# Patient Record
Sex: FEMALE | Race: BLACK OR AFRICAN AMERICAN | NOT HISPANIC OR LATINO | Employment: OTHER | URBAN - METROPOLITAN AREA
[De-identification: names, ages, dates, MRNs, and addresses within clinical notes are randomized per-mention and may not be internally consistent; named-entity substitution may affect disease eponyms.]

---

## 2023-04-13 DIAGNOSIS — E11.9 TYPE 2 DIABETES MELLITUS WITHOUT COMPLICATION, WITHOUT LONG-TERM CURRENT USE OF INSULIN (MULTI): ICD-10-CM

## 2023-04-13 DIAGNOSIS — E11.9 TYPE 2 DIABETES MELLITUS WITHOUT COMPLICATIONS (MULTI): ICD-10-CM

## 2023-04-13 DIAGNOSIS — J30.9 ALLERGIC RHINITIS, UNSPECIFIED SEASONALITY, UNSPECIFIED TRIGGER: Primary | ICD-10-CM

## 2023-04-13 DIAGNOSIS — I10 PRIMARY HYPERTENSION: ICD-10-CM

## 2023-04-13 RX ORDER — SEMAGLUTIDE 1.34 MG/ML
0.5 INJECTION, SOLUTION SUBCUTANEOUS
COMMUNITY
Start: 2023-02-17 | End: 2023-04-17 | Stop reason: SDUPTHER

## 2023-04-13 RX ORDER — LISINOPRIL 20 MG/1
TABLET ORAL
Qty: 180 TABLET | Refills: 0 | Status: SHIPPED | OUTPATIENT
Start: 2023-04-13 | End: 2023-07-31

## 2023-04-13 RX ORDER — MONTELUKAST SODIUM 10 MG/1
TABLET ORAL
Qty: 90 TABLET | Refills: 0 | Status: SHIPPED | OUTPATIENT
Start: 2023-04-13 | End: 2023-12-11

## 2023-04-13 RX ORDER — CLOPIDOGREL BISULFATE 75 MG/1
TABLET ORAL
Qty: 90 TABLET | Refills: 0 | Status: SHIPPED | OUTPATIENT
Start: 2023-04-13 | End: 2023-07-31 | Stop reason: SDUPTHER

## 2023-04-17 RX ORDER — SEMAGLUTIDE 1.34 MG/ML
0.5 INJECTION, SOLUTION SUBCUTANEOUS
Qty: 3 ML | Refills: 0 | Status: SHIPPED | OUTPATIENT
Start: 2023-04-17 | End: 2023-04-24 | Stop reason: ALTCHOICE

## 2023-04-24 ENCOUNTER — TELEMEDICINE (OUTPATIENT)
Dept: PRIMARY CARE | Facility: CLINIC | Age: 78
End: 2023-04-24
Payer: MEDICARE

## 2023-04-24 DIAGNOSIS — E11.9 TYPE 2 DIABETES MELLITUS WITHOUT COMPLICATION, WITHOUT LONG-TERM CURRENT USE OF INSULIN (MULTI): Primary | ICD-10-CM

## 2023-04-24 PROCEDURE — 99213 OFFICE O/P EST LOW 20 MIN: CPT | Performed by: INTERNAL MEDICINE

## 2023-04-24 NOTE — PROGRESS NOTES
Katarina presents in virtual FU for Diabetes to 2.2023 MCR/CPE.   At that time started Ozempic s/p 4 wks 0.25 and 2 wks 0.5   No intolerance   Will uptitrate to 1 mg   Will order labs ahead of 2 mo in person FU (June 2023)       1. HTN   -coreg 12.5 BID   -lisin 20     2. HFpEF   -torsemide 20 hussein y  -h/o worsening edema on amlo   -TTE 6.22 reviewed     3. COPD/Asthma/ILD (UIP vs hypersensitivity pneumonitis)   -FU pulm   -continuous 3L O2   -Advair   -Incruse    4. T2DM 7.7% in 6.22  -Jardiance  -Humulin 42/47 BID   +atorva 40   +lisin   -FU opthal and podiatry 1.2023     6. GERD, ulcerative colitis   -pantoprazole   -balsalazide   -cscope 11.22: UC Todd Score 1 (mild disease), unchanged from previous     7. DLD, CAC of 133 in 1.22   -atorva 40   -at goal 6.22     8. AMY, on PAP   -FU sleep      #HM   -cscope 11.22 for UC  -shingrix x2   -tdap due   -prevnar due   -pneumovax utd  -mammo 8.31.22 , has been every other year

## 2023-04-28 DIAGNOSIS — I50.9 HEART FAILURE, UNSPECIFIED HF CHRONICITY, UNSPECIFIED HEART FAILURE TYPE (MULTI): Primary | ICD-10-CM

## 2023-05-01 RX ORDER — TORSEMIDE 20 MG/1
TABLET ORAL
Qty: 90 TABLET | Refills: 1 | Status: SHIPPED | OUTPATIENT
Start: 2023-05-01 | End: 2023-10-30

## 2023-05-01 RX ORDER — CARVEDILOL 12.5 MG/1
TABLET ORAL
Qty: 180 TABLET | Refills: 1 | Status: SHIPPED | OUTPATIENT
Start: 2023-05-01 | End: 2023-10-30

## 2023-05-03 DIAGNOSIS — J45.909 ASTHMA, UNSPECIFIED ASTHMA SEVERITY, UNSPECIFIED WHETHER COMPLICATED, UNSPECIFIED WHETHER PERSISTENT (HHS-HCC): ICD-10-CM

## 2023-05-03 RX ORDER — ALBUTEROL SULFATE 90 UG/1
AEROSOL, METERED RESPIRATORY (INHALATION)
COMMUNITY
Start: 2020-01-21 | End: 2023-05-03 | Stop reason: SDUPTHER

## 2023-05-04 RX ORDER — ALBUTEROL SULFATE 90 UG/1
AEROSOL, METERED RESPIRATORY (INHALATION)
Qty: 25.5 G | Refills: 1 | Status: SHIPPED | OUTPATIENT
Start: 2023-05-04 | End: 2023-07-20

## 2023-05-31 ENCOUNTER — LAB (OUTPATIENT)
Dept: LAB | Facility: LAB | Age: 78
End: 2023-05-31
Payer: MEDICARE

## 2023-05-31 DIAGNOSIS — E11.9 TYPE 2 DIABETES MELLITUS WITHOUT COMPLICATION, WITHOUT LONG-TERM CURRENT USE OF INSULIN (MULTI): ICD-10-CM

## 2023-05-31 LAB
ALANINE AMINOTRANSFERASE (SGPT) (U/L) IN SER/PLAS: 14 U/L (ref 7–45)
ALBUMIN (G/DL) IN SER/PLAS: 3.9 G/DL (ref 3.4–5)
ALKALINE PHOSPHATASE (U/L) IN SER/PLAS: 79 U/L (ref 33–136)
ANION GAP IN SER/PLAS: 11 MMOL/L (ref 10–20)
ASPARTATE AMINOTRANSFERASE (SGOT) (U/L) IN SER/PLAS: 16 U/L (ref 9–39)
BILIRUBIN TOTAL (MG/DL) IN SER/PLAS: 0.4 MG/DL (ref 0–1.2)
CALCIUM (MG/DL) IN SER/PLAS: 10.1 MG/DL (ref 8.6–10.6)
CARBON DIOXIDE, TOTAL (MMOL/L) IN SER/PLAS: 34 MMOL/L (ref 21–32)
CHLORIDE (MMOL/L) IN SER/PLAS: 101 MMOL/L (ref 98–107)
CREATININE (MG/DL) IN SER/PLAS: 0.89 MG/DL (ref 0.5–1.05)
ESTIMATED AVERAGE GLUCOSE FOR HBA1C: 151 MG/DL
GFR FEMALE: 66 ML/MIN/1.73M2
GLUCOSE (MG/DL) IN SER/PLAS: 124 MG/DL (ref 74–99)
HEMOGLOBIN A1C/HEMOGLOBIN TOTAL IN BLOOD: 6.9 %
POTASSIUM (MMOL/L) IN SER/PLAS: 4.4 MMOL/L (ref 3.5–5.3)
PROTEIN TOTAL: 7.2 G/DL (ref 6.4–8.2)
SODIUM (MMOL/L) IN SER/PLAS: 142 MMOL/L (ref 136–145)
UREA NITROGEN (MG/DL) IN SER/PLAS: 28 MG/DL (ref 6–23)

## 2023-05-31 PROCEDURE — 83036 HEMOGLOBIN GLYCOSYLATED A1C: CPT

## 2023-05-31 PROCEDURE — 36415 COLL VENOUS BLD VENIPUNCTURE: CPT

## 2023-05-31 PROCEDURE — 80053 COMPREHEN METABOLIC PANEL: CPT

## 2023-06-06 ENCOUNTER — OFFICE VISIT (OUTPATIENT)
Dept: PRIMARY CARE | Facility: CLINIC | Age: 78
End: 2023-06-06
Payer: MEDICARE

## 2023-06-06 VITALS
WEIGHT: 213 LBS | HEIGHT: 61 IN | DIASTOLIC BLOOD PRESSURE: 60 MMHG | BODY MASS INDEX: 40.22 KG/M2 | SYSTOLIC BLOOD PRESSURE: 105 MMHG

## 2023-06-06 DIAGNOSIS — H60.90 OTITIS EXTERNA, UNSPECIFIED CHRONICITY, UNSPECIFIED LATERALITY, UNSPECIFIED TYPE: ICD-10-CM

## 2023-06-06 DIAGNOSIS — E11.9 TYPE 2 DIABETES MELLITUS WITHOUT COMPLICATION, WITHOUT LONG-TERM CURRENT USE OF INSULIN (MULTI): Primary | ICD-10-CM

## 2023-06-06 PROCEDURE — 99214 OFFICE O/P EST MOD 30 MIN: CPT | Performed by: INTERNAL MEDICINE

## 2023-06-06 RX ORDER — UMECLIDINIUM 62.5 UG/1
1 AEROSOL, POWDER ORAL DAILY
COMMUNITY
Start: 2020-12-21

## 2023-06-06 RX ORDER — CIPROFLOXACIN AND DEXAMETHASONE 3; 1 MG/ML; MG/ML
4 SUSPENSION/ DROPS AURICULAR (OTIC) 2 TIMES DAILY
Qty: 7.5 ML | Refills: 0 | Status: SHIPPED | OUTPATIENT
Start: 2023-06-06 | End: 2023-06-16

## 2023-06-06 RX ORDER — INSULIN HUMAN 100 [IU]/ML
47 INJECTION, SUSPENSION SUBCUTANEOUS NIGHTLY
COMMUNITY
Start: 2019-02-22

## 2023-06-06 RX ORDER — PANTOPRAZOLE SODIUM 40 MG/1
1 TABLET, DELAYED RELEASE ORAL DAILY
COMMUNITY
Start: 2015-09-02 | End: 2023-08-24 | Stop reason: SDUPTHER

## 2023-06-06 RX ORDER — BALSALAZIDE DISODIUM 750 MG/1
1500 CAPSULE ORAL 3 TIMES DAILY
COMMUNITY
Start: 2016-01-20

## 2023-06-06 RX ORDER — FLUTICASONE PROPIONATE AND SALMETEROL 500; 50 UG/1; UG/1
1 POWDER RESPIRATORY (INHALATION) 2 TIMES DAILY
COMMUNITY
Start: 2019-02-27

## 2023-06-06 RX ORDER — EMPAGLIFLOZIN 10 MG/1
1 TABLET, FILM COATED ORAL DAILY
COMMUNITY
Start: 2022-08-09 | End: 2023-06-26 | Stop reason: SDUPTHER

## 2023-06-06 RX ORDER — HUMAN INSULIN 100 [IU]/ML
42 INJECTION, SUSPENSION SUBCUTANEOUS EVERY MORNING
COMMUNITY
Start: 2023-01-08 | End: 2024-02-05

## 2023-06-06 NOTE — PATIENT INSTRUCTIONS
Katarina,     Reduce your insulin from 42/47 units to 35 units twice a day. If you continue to have ANY sugars under 70, reduce further to 30/30 and call me !   I think Ozempic has been great for your sugar control and will eventually yield significant weight loss, but I am afraid it is causing your GI discomfort. I think we should try going down to 0.5 mg instead of 1 mg the next time you fill it and see how that goes. If you still feel the same, we might even need to consider stopping the Ozmepic - which may mean going up on the insulin a little bit.   [place] [facility] - you can message me anytime and see lab results!   Change your lisinopril from 20 mg twice a day to 20 mg once a day - at bedtime only.   See me back in 3 mo!     CL

## 2023-06-06 NOTE — PROGRESS NOTES
Subjective   Patient ID: Katarina Hernandez is a 77 y.o. female who presents for No chief complaint on file..  HPI    Katarina Hernandez is a 76 yo F presenting for f/u. Last seen on 2/2023 for CPE. Then had a virtual appointment 4/2023. Was started on ozempic 0.25 for 4 weeks, 0.5 for 2 weeks, now on 1mg. No diarrhea, but does note some stomach discomfort throughout the day. Started when she first started on ozempic. It's tolerable but uncomfortable.     Sometimes sees glucose as low as 60s. Have had some shakiness and sweating once a week or so. Checks sugars 2 times a day.     BPs at home 110s systolics. No leg swelling.     Some pain in the right ear for the last few days. Has some vertigo on and off.     Also some stinging/burning pain in the thighs.     Review of Systems    Objective   Visit Vitals  /60      Physical Exam  Gen Aox3 NAD well appearing   HEENT: R ear slight erythema in the canal  Eyes sclerae clear PERRL  CV rrr nl s1/2 no m/r/g   Pulm CTAB, slight crackles in bases  Abd soft NT ND NABS   Ext warm dry no edema   Skin no rash   Psych nl mood nl affect good eye contact    Assessment/Plan     #DMII  -recently started on ozempic, some stomach discomfort  -reduce ozempic to 0.5mg  -A1C 6.9 (from 7.9 3 months prior), but with hypoglycemic episodes  -novolin 42/47 BID -> reduce to 35/35  -c/w jardiance 10  -follows with ophthal and podiatry  -urine alb <7 in 2/2023    #Neuralgia paresthetica  -will monitor for now    #R ear discomfort  -possible mild otitis externa    #HTN  -BP 100s/60s in the office, will continue to watch  -c/w coreg 12.5, lisin 20  -hx of leg swelling on amlo    #HFpEF  -c/w torsemide 20  -TTE 6/2022: preserved EF    #COPD/Asthma/ILD (UIP vs hypersensitivity pneumonitis)  -f/u pulm, on 2L NC at rest  -on Advair 500/50 twice daily and Incruse Ellipta daily, montelukast  -CT chest 5/2023 with stable fibrosis, mildly dilated main PA    #DLD/TIA  -c/w atorva 40, plavix  -LDL at goal (59)  on 2/2023    #UC, GERD  -c/w protonix, balsalazide  -Cscope 11/22 w/ mild disease (wyman score 1), unchanged    #AMY, on PAP   -FU sleep    #HM   -cscope 11.22 for UC, f/u with GI for surveillance timing  -shingrix x2   -tdap, prevnar 2023   -pneumovax utd  -mammo 8.31.22 , has been every other year

## 2023-06-10 NOTE — PROGRESS NOTES
I reviewed with the resident the medical history and the resident’s findings on physical examination.  I discussed with the resident the patient’s diagnosis and concur with the treatment plan as documented in the resident note.         Katarina Hernandez presents in FU to 4.23 visit.   CPE/MCR 2.2023.       At that time started Ozempic s/p 4 wks 0.25 and 2 wks 0.5   No intolerance   Will uptitrate to 1 mg   Will order labs ahead of 2 mo in person FU (June 2023)         1. HTN   -coreg 12.5 BID   -lisin 20      2. HFpEF   -torsemide 20 hussein y  -h/o worsening edema on amlo   -TTE 6.22 reviewed      3. COPD/Asthma/ILD (UIP vs hypersensitivity pneumonitis)   -FU pulm   -continuous 3L O2   -Advair   -Incruse     4. T2DM 7.7% in 6.22  -Jardiance  -Humulin 42/47 BID   +atorva 40   +lisin   -FU opthal and podiatry 1.2023      6. GERD, ulcerative colitis   -pantoprazole   -balsalazide   -cscope 11.22: UC Todd Score 1 (mild disease), unchanged from previous      7. DLD, CAC of 133 in 1.22   -atorva 40   -at goal 6.22      8. AMY, on PAP   -FU sleep        #HM   -cscope 11.22 for UC  -shingrix x2   -tdap due   -prevnar due   -pneumovax utd  -mammo 8.31.22 , has been every other year           Ana Clark MD

## 2023-06-26 DIAGNOSIS — E11.9 TYPE 2 DIABETES MELLITUS WITHOUT COMPLICATION, WITHOUT LONG-TERM CURRENT USE OF INSULIN (MULTI): ICD-10-CM

## 2023-07-18 DIAGNOSIS — J45.909 ASTHMA, UNSPECIFIED ASTHMA SEVERITY, UNSPECIFIED WHETHER COMPLICATED, UNSPECIFIED WHETHER PERSISTENT (HHS-HCC): ICD-10-CM

## 2023-07-20 RX ORDER — ALBUTEROL SULFATE 90 UG/1
AEROSOL, METERED RESPIRATORY (INHALATION)
Qty: 18 G | Refills: 1 | Status: SHIPPED | OUTPATIENT
Start: 2023-07-20 | End: 2024-06-10

## 2023-07-31 ENCOUNTER — OFFICE VISIT (OUTPATIENT)
Dept: PRIMARY CARE | Facility: CLINIC | Age: 78
End: 2023-07-31
Payer: MEDICARE

## 2023-07-31 VITALS
BODY MASS INDEX: 40.28 KG/M2 | OXYGEN SATURATION: 93 % | SYSTOLIC BLOOD PRESSURE: 102 MMHG | TEMPERATURE: 97.6 F | DIASTOLIC BLOOD PRESSURE: 62 MMHG | HEART RATE: 73 BPM | WEIGHT: 213.2 LBS

## 2023-07-31 DIAGNOSIS — E11.9 TYPE 2 DIABETES MELLITUS WITHOUT COMPLICATIONS (MULTI): ICD-10-CM

## 2023-07-31 DIAGNOSIS — H81.10 BENIGN PAROXYSMAL POSITIONAL VERTIGO, UNSPECIFIED LATERALITY: ICD-10-CM

## 2023-07-31 PROCEDURE — 1126F AMNT PAIN NOTED NONE PRSNT: CPT | Performed by: INTERNAL MEDICINE

## 2023-07-31 PROCEDURE — 99214 OFFICE O/P EST MOD 30 MIN: CPT | Performed by: INTERNAL MEDICINE

## 2023-07-31 RX ORDER — CLOPIDOGREL BISULFATE 75 MG/1
75 TABLET ORAL DAILY
Qty: 90 TABLET | Refills: 3 | Status: SHIPPED | OUTPATIENT
Start: 2023-07-31

## 2023-07-31 RX ORDER — LISINOPRIL 5 MG/1
5 TABLET ORAL DAILY
Qty: 90 TABLET | Refills: 3 | Status: SHIPPED | OUTPATIENT
Start: 2023-07-31 | End: 2024-05-13

## 2023-07-31 NOTE — LETTER
To Whom It May Concern:     Katarina Hernandez is under my general medical care and has type 2 diabetes, poor circulation and history of pre-ulcerative calluses. She will benefit from diabetic shoes and custom inserts because it will help reduce her risk of foot wounds, reduce her risk for future critical limb disease and help prevent falls.       Sincerely,         Ana Clark M.D.

## 2023-07-31 NOTE — PATIENT INSTRUCTIONS
Katarina,     Call the referral line to schedule vestibular rehabilitation.   Check your home lisinopril - if it is 20 mg, we should reduce it to 5 mg and I am sending a prescription for that. If it is already 5 mg, call or message and we can talk about reducing another one of your blood pressure meds (carvedilol).     CL

## 2023-07-31 NOTE — PROGRESS NOTES
Subjective   Patient ID: Katarina Hernandez is a 77 y.o. female She/her/hers who presents for No chief complaint on file..  HPI    MsChau Hernandez is presenting today in FU with ongoing vertigo and consistent nausea while on Ozempic.    She reports that she has been having vertigo-like symptoms and feeling like equilibrium is off. This has been intermittent long-term, but over the past two weeks has been daily. She also has a headache and feels more comfortable with neck extended. Her vertigo worsens at night when she turns in bed and while walking around. No falls or LOC. Got abx in June for external ear infection, feels her sx have been worse since taking those drops. Average blood sugar in 130s, no frequent lows since cutting insulin in June. BPs have been 110s since Lisinopril was cut from 10->5mg daily, but once spiked to 140s last week.    Additionally, she has been Ozempic and feels constantly nauseated and unwell, with epigastric discomfort. She feels as though her intestines are swollen and she feels bloated. Her dose was reduced to 0.5mg which has not changed her symptoms significantly. She has not had signficant weight loss since she was last seen, and she would like to stop taking the Ozempic. Has not been on any other GLP-1s in the past.    She denies any other complaints at this time.     PMH:   Past Medical History:   Diagnosis Date    Combined forms of age-related cataract, left eye 07/22/2019    Combined form of age-related cataract, left eye    Combined forms of age-related cataract, right eye 08/30/2019    Combined form of age-related cataract, right eye    Lesion of ulnar nerve, unspecified upper limb     Ulnar neuropathy    Personal history of colonic polyps     History of colonic polyps    Transient cerebral ischemic attack, unspecified     TIA (transient ischemic attack)    Unspecified acute conjunctivitis, bilateral 01/27/2020    Acute conjunctivitis of both eyes    Unspecified cataract      Cataracts, both eyes    Viral conjunctivitis, unspecified 11/08/2019    Acute viral conjunctivitis of right eye      PSH:   Past Surgical History:   Procedure Laterality Date    MR HEAD ANGIO WO IV CONTRAST  1/19/2019    MR HEAD ANGIO WO IV CONTRAST 1/19/2019 [facility]    MR NECK ANGIO WO IV CONTRAST  1/19/2019    MR NECK ANGIO WO IV CONTRAST 1/19/2019 [facility]    OTHER SURGICAL HISTORY  01/28/2019    Appendectomy    OTHER SURGICAL HISTORY  01/14/2020    Colonoscopy    OTHER SURGICAL HISTORY  01/14/2020    Esophagogastroduodenoscopy    OTHER SURGICAL HISTORY  09/09/2019    Cataract surgery      Allergies: No Known Allergies   Medications:   Current Outpatient Medications:     albuterol 90 mcg/actuation inhaler, USE 1 TO 2 INHALATIONS EVERY 4 TO 6 HOURS AS NEEDED, Disp: 18 g, Rfl: 1    atorvastatin (Lipitor) 40 mg tablet, TAKE 1 TABLET AT BEDTIME, Disp: 90 tablet, Rfl: 0    balsalazide (Colazal) 750 mg capsule, Take 2 capsules (1,500 mg) by mouth 3 times a day., Disp: , Rfl:     carvedilol (Coreg) 12.5 mg tablet, TAKE 1 TABLET BY MOUTH TWICE A DAY, Disp: 180 tablet, Rfl: 1    clopidogrel (Plavix) 75 mg tablet, TAKE 1 TABLET BY MOUTH EVERY DAY, Disp: 90 tablet, Rfl: 0    empagliflozin (Jardiance) 10 mg, Take 1 tablet (10 mg) by mouth once daily., Disp: 90 tablet, Rfl: 1    fluticasone propion-salmeteroL (Advair Diskus) 500-50 mcg/dose diskus inhaler, Inhale 1 puff 2 times a day., Disp: , Rfl:     insulin NPH, Isophane, (HumuLIN N NPH U-100 Insulin) 100 unit/mL injection, Inject 47 Units under the skin once daily at bedtime., Disp: , Rfl:     insulin NPH, Isophane, (NovoLIN N NPH U-100 Insulin) 100 unit/mL injection, Inject 42 Units under the skin once daily in the morning., Disp: , Rfl:     lisinopril 20 mg tablet, TAKE 1 TABLET BY MOUTH TWICE A DAY, Disp: 180 tablet, Rfl: 0    montelukast (Singulair) 10 mg tablet, TAKE 1 TABLET BY MOUTH EVERY DAY, Disp: 90 tablet, Rfl: 0    pantoprazole  (ProtoNix) 40 mg EC tablet, Take 1 tablet (40 mg) by mouth once daily., Disp: , Rfl:     semaglutide 0.25 mg or 0.5 mg (2 mg/3 mL) pen injector, Inject 0.5 mg under the skin 1 (one) time per week., Disp: 9 mL, Rfl: 1    torsemide (Demadex) 20 mg tablet, TAKE 1 TABLET BY MOUTH EVERY DAY, Disp: 90 tablet, Rfl: 1    umeclidinium (Incruse Ellipta) 62.5 mcg/actuation inhalation, Inhale 1 puff (62.5 mcg) once daily., Disp: , Rfl:    FH: family history is not on file.   Social Hx:   Social Determinants of Health     Tobacco Use: Not on file   Alcohol Use: Not on file   Financial Resource Strain: Not on file   Food Insecurity: Not on file   Transportation Needs: Not on file   Physical Activity: Not on file   Stress: Not on file   Social Connections: Not on file   Intimate Partner Violence: Not on file   Depression: Not on file   Housing Stability: Not on file   Utilities: Not on file        Review of Systems    Objective   There were no vitals taken for this visit.   Physical Exam  Constitutional: awake and alert, resting comfortably in NAD, alert and oriented x3  Eyes: No scleral icterus or conjunctival injection, PERRLA, EOM grossly intact  ENMT: MMM, NC in place  Head/Neck: NC/AT, no cervical LAD  Respiratory/Thorax: Breathing comfortably with NC. No retractions or accessory muscle use. Diminished air entry into all lung fields. CTAB, no wheezes, rales, rhonchi or crackles  Cardiovascular: RRR, +S1, S2, no m/r/g  Gastrointestinal: normoactive BS, soft, ND, dull tenderness to palpation of LUQ and epigastric region. No rebound or guarding.  Extremities: warm and well perfused, no LE edema, 2+ radial and dorsalis pedis pulses b/l, capillary refill <2s  Neurological: motor and sensation grossly intact and symmetric  Psychological: Mood and affect appropriate for age         Assessment/Plan   Problem List Items Addressed This Visit    None    Ms. Katarina Hernandez is a 77-year-old female with history of COPD on 2 L nasal cannula  at baseline, hypertension, type 2 diabetes, obesity presenting in follow-up for ongoing symptoms of vertigo and persistent nausea while on Ozempic.  Her vertiginous symptoms appear most likely secondary to BPPV rather intra auricular process.  She was recently treated for middle ear infection, however on exam today findings are not concerning for ongoing infection, and this would not explain her symptoms adequately.  Additionally, she does appear to have some degree of possible relative hypotension, however her symptoms are exacerbated with turning in bed at night.  We will continue to monitor and titrate blood pressure medications as needed.  Blood sugars have remained well controlled while on Ozempic, and with reduction in insulin dosing.  At this time, the patient is not tolerating Ozempic given significant GI side effects and would like to discontinue.  We will trial off for 1 month to for symptom improvement, and will reassess how to optimize diabetes management at that time.    #DMII  -not tolerating Ozempic w/sig GI side effects after downtitration of dose, will discontinue at this time and fu in 1 month  -A1C 6.9 (from 7.9 3 months prior)  -novolin 42/47 BID -> reduced to 35/35, fewer hypoglycemic episodes  -c/w jardiance 10  -follows with ophthal and podiatry  -urine alb <7 in 2/2023  -will monitor for sx improvement off ozempic and determine optimal pharmacotherapy for DM management in 1 month     #Neuralgia paresthetica  -will monitor for now     #Vertigo  #R ear discomfort  -s/p abx therapy for mild otitis externa without sx improvement  -will refer to vestibular therapy     #HTN  -BP 100s/60s in the office, will continue to watch  -c/w coreg 12.5, lisin 5  -hx of leg swelling on amlo     #HFpEF  -c/w torsemide 20  -TTE 6/2022: preserved EF     #COPD/Asthma/ILD (UIP vs hypersensitivity pneumonitis)  -f/u pulm, on 2L NC at rest  -on Advair 500/50 twice daily and Incruse Ellipta daily, montelukast  -CT  chest 5/2023 with stable fibrosis, mildly dilated main PA     #DLD/TIA  -c/w atorva 40, plavix  -LDL at goal (59) on 2/2023     #UC, GERD  -c/w protonix, balsalazide  -Cscope 11/22 w/ mild disease (wyman score 1), unchanged     #AMY, on PAP   -FU sleep     #HM   -cscope 11.22 for UC, f/u with GI for surveillance timing  -shingrix x2   -tdap, prevnar 2023   -pneumovax utd  -mammo 8.31.22 , has been every other year     The patient was seen and discussed with Attending Physician, Dr. Muñoz.    Beth Tariq MD, MPH  Internal Medicine-Pediatrics, PGY-2  [facility]  [facility]  Please note that voice recognition software was used to dictate this note. Please excuse any errors.

## 2023-08-23 ENCOUNTER — TELEPHONE (OUTPATIENT)
Dept: PRIMARY CARE | Facility: CLINIC | Age: 78
End: 2023-08-23
Payer: MEDICARE

## 2023-08-23 NOTE — TELEPHONE ENCOUNTER
Patient  said can she just stop pantoprazole or can you prescribe something else  its not working for her    Also there was forms sent for diabetic shoes, she need you to fill , she said they were sent at beginning of the month

## 2023-08-24 DIAGNOSIS — K21.9 GASTROESOPHAGEAL REFLUX DISEASE, UNSPECIFIED WHETHER ESOPHAGITIS PRESENT: ICD-10-CM

## 2023-08-24 DIAGNOSIS — K21.9 GASTROESOPHAGEAL REFLUX DISEASE, UNSPECIFIED WHETHER ESOPHAGITIS PRESENT: Primary | ICD-10-CM

## 2023-08-24 RX ORDER — PANTOPRAZOLE SODIUM 40 MG/1
40 TABLET, DELAYED RELEASE ORAL DAILY
Qty: 90 TABLET | Refills: 3 | Status: SHIPPED | OUTPATIENT
Start: 2023-08-24 | End: 2023-09-01

## 2023-09-01 DIAGNOSIS — K21.9 GASTROESOPHAGEAL REFLUX DISEASE, UNSPECIFIED WHETHER ESOPHAGITIS PRESENT: ICD-10-CM

## 2023-09-01 RX ORDER — LANSOPRAZOLE 30 MG/1
CAPSULE, DELAYED RELEASE ORAL
Qty: 90 CAPSULE | Refills: 3 | Status: SHIPPED | OUTPATIENT
Start: 2023-09-01 | End: 2023-09-06

## 2023-09-05 DIAGNOSIS — E78.5 HYPERLIPIDEMIA, UNSPECIFIED HYPERLIPIDEMIA TYPE: ICD-10-CM

## 2023-09-05 RX ORDER — ATORVASTATIN CALCIUM 40 MG/1
40 TABLET, FILM COATED ORAL NIGHTLY
Qty: 90 TABLET | Refills: 1 | Status: SHIPPED | OUTPATIENT
Start: 2023-09-05 | End: 2024-02-05

## 2023-09-06 RX ORDER — PANTOPRAZOLE SODIUM 40 MG/1
TABLET, DELAYED RELEASE ORAL
Qty: 90 TABLET | Refills: 3 | Status: SHIPPED | OUTPATIENT
Start: 2023-09-06

## 2023-10-03 ENCOUNTER — PROCEDURE VISIT (OUTPATIENT)
Dept: PODIATRY | Facility: CLINIC | Age: 78
End: 2023-10-03
Payer: MEDICARE

## 2023-10-03 DIAGNOSIS — B35.1 ONYCHOMYCOSIS: ICD-10-CM

## 2023-10-03 DIAGNOSIS — G62.9 NEUROPATHY: ICD-10-CM

## 2023-10-03 DIAGNOSIS — M79.672 FOOT PAIN, BILATERAL: Primary | ICD-10-CM

## 2023-10-03 DIAGNOSIS — M79.671 FOOT PAIN, BILATERAL: Primary | ICD-10-CM

## 2023-10-03 PROCEDURE — 99213 OFFICE O/P EST LOW 20 MIN: CPT | Performed by: PODIATRIST

## 2023-10-03 NOTE — PROGRESS NOTES
This is a 78 y.o. female new patient for foot pain fu    History of Present Illness:   Patient states they are here for nails as patient states she is unable to safely trim on her own  Patient also states the cream she has cleared up her prior rash  States she has NTB to feet, it does not wake her at night.       Past Medical History  Past Medical History:   Diagnosis Date    Combined forms of age-related cataract, left eye 07/22/2019    Combined form of age-related cataract, left eye    Combined forms of age-related cataract, right eye 08/30/2019    Combined form of age-related cataract, right eye    Lesion of ulnar nerve, unspecified upper limb     Ulnar neuropathy    Personal history of colonic polyps     History of colonic polyps    Transient cerebral ischemic attack, unspecified     TIA (transient ischemic attack)    Unspecified acute conjunctivitis, bilateral 01/27/2020    Acute conjunctivitis of both eyes    Unspecified cataract     Cataracts, both eyes    Viral conjunctivitis, unspecified 11/08/2019    Acute viral conjunctivitis of right eye       Medications and Allergies have been reviewed.    Review Of Systems:  GENERAL: No weight loss, malaise or fevers.  HEENT: Negative for frequent or significant headaches,   RESPIRATORY: Negative for cough, wheezing or shortness of breath.  CARDIOVASCULAR: Negative for chest pain, leg swelling or palpitations.    Physical Exam:  Patient is a pleasant, cooperative, well developed 78 y.o.  adult female. The patient is alert and oriented to time, place and person.   Patient has normal affect and mood.    Examination of Both Lower Extremities:   Objective:   Vasc: DP and PT pulses are palpable bilateral.  CFT is less than 3 seconds bilateral.  Skin temperature is warm to cool proximal to distal bilateral.      Neuro: Vibratory, light touch and proprioception are intact bilateral.  Protective sensation is intact to the foot . Admits to mild NTB    Derm: Nails 1-5 bilateral  are elongated.  Skin is supple with normal texture and turgor noted.  Webspaces are clean, dry and intact bilateral.      Ortho: Muscle strength is 5/5 for all pedal groups tested.  Ankle joint, subtalar joint, 1st MPJ and lesser MPJ ROM is full and without pain or crepitus.      A comprehensive history and physical exam was performed. The patient was educated on clinical and radiographic findings, diagnosis, and treatment plans.    1. Foot pain, bilateral        2. Neuropathy        3. Onychomycosis              Patient educated on proper foot care.  Nails 1-5 b/l were debrided in thickness and length with nail cutting forceps.  Use cream for rash prn. Area looks good as of now  Discussed neuropathy , decided no treatment needed at this time   Patient to follow up in 6 mos or sooner if any problems arise.   Patient was in agreement to this plan. All questions answered.      Melida Vega DPM  [phone]  [facility]  Fax: [phone]

## 2023-10-09 DIAGNOSIS — J44.9 CHRONIC OBSTRUCTIVE PULMONARY DISEASE, UNSPECIFIED COPD TYPE (MULTI): Primary | ICD-10-CM

## 2023-10-09 RX ORDER — FLUTICASONE FUROATE, UMECLIDINIUM BROMIDE AND VILANTEROL TRIFENATATE 200; 62.5; 25 UG/1; UG/1; UG/1
1 POWDER RESPIRATORY (INHALATION)
Qty: 1 EACH | Refills: 3 | Status: SHIPPED | OUTPATIENT
Start: 2023-10-09 | End: 2024-02-08 | Stop reason: SDUPTHER

## 2023-10-25 PROBLEM — R55 NEAR SYNCOPE: Status: ACTIVE | Noted: 2023-10-25

## 2023-10-25 PROBLEM — E11.65 UNCONTROLLED TYPE 2 DIABETES MELLITUS WITH HYPERGLYCEMIA (MULTI): Status: ACTIVE | Noted: 2023-10-25

## 2023-10-25 PROBLEM — H01.009 BLEPHARITIS OF BOTH UPPER AND LOWER EYELID: Status: ACTIVE | Noted: 2023-10-25

## 2023-10-25 PROBLEM — Z86.73 HISTORY OF TIA (TRANSIENT ISCHEMIC ATTACK): Status: ACTIVE | Noted: 2023-10-25

## 2023-10-25 PROBLEM — H43.811 PVD (POSTERIOR VITREOUS DETACHMENT), RIGHT EYE: Status: ACTIVE | Noted: 2023-10-25

## 2023-10-25 PROBLEM — J84.89 NSIP (NONSPECIFIC INTERSTITIAL PNEUMONIA) (MULTI): Status: ACTIVE | Noted: 2023-10-25

## 2023-10-25 PROBLEM — H40.1131 PRIMARY OPEN ANGLE GLAUCOMA (POAG) OF BOTH EYES, MILD STAGE: Status: ACTIVE | Noted: 2023-10-25

## 2023-10-25 PROBLEM — E11.9 DIABETES MELLITUS (MULTI): Status: ACTIVE | Noted: 2023-10-25

## 2023-10-25 PROBLEM — B35.1 ONYCHOMYCOSIS: Status: ACTIVE | Noted: 2023-10-25

## 2023-10-25 PROBLEM — M79.672 BILATERAL LEG AND FOOT PAIN: Status: ACTIVE | Noted: 2023-10-25

## 2023-10-25 PROBLEM — R00.2 PALPITATIONS: Status: ACTIVE | Noted: 2023-10-25

## 2023-10-25 PROBLEM — Z86.0100 HISTORY OF COLON POLYPS: Status: ACTIVE | Noted: 2023-10-25

## 2023-10-25 PROBLEM — H01.003 BLEPHARITIS, BILATERAL: Status: ACTIVE | Noted: 2023-10-25

## 2023-10-25 PROBLEM — Z86.59 HISTORY OF CLAUSTROPHOBIA: Status: ACTIVE | Noted: 2023-10-25

## 2023-10-25 PROBLEM — R49.9 CHANGE IN VOICE: Status: ACTIVE | Noted: 2023-10-25

## 2023-10-25 PROBLEM — B35.3 TINEA PEDIS OF BOTH FEET: Status: ACTIVE | Noted: 2023-10-25

## 2023-10-25 PROBLEM — J44.89 ASTHMA-COPD OVERLAP SYNDROME (MULTI): Status: ACTIVE | Noted: 2023-10-25

## 2023-10-25 PROBLEM — S00.461A: Status: ACTIVE | Noted: 2021-08-30

## 2023-10-25 PROBLEM — M25.512 LEFT SHOULDER PAIN: Status: ACTIVE | Noted: 2023-10-25

## 2023-10-25 PROBLEM — H26.492 PCO (POSTERIOR CAPSULAR OPACIFICATION), LEFT: Status: ACTIVE | Noted: 2023-10-25

## 2023-10-25 PROBLEM — K21.9 LPRD (LARYNGOPHARYNGEAL REFLUX DISEASE): Status: ACTIVE | Noted: 2023-10-25

## 2023-10-25 PROBLEM — H52.10 MYOPIA WITH PRESBYOPIA: Status: ACTIVE | Noted: 2023-10-25

## 2023-10-25 PROBLEM — H16.143 PUNCTATE EPITHELIAL KERATOPATHY OF BOTH EYES: Status: ACTIVE | Noted: 2018-02-06

## 2023-10-25 PROBLEM — M79.675 PAIN IN TOES OF BOTH FEET: Status: ACTIVE | Noted: 2023-10-25

## 2023-10-25 PROBLEM — L85.3 XEROSIS CUTIS: Status: ACTIVE | Noted: 2021-08-30

## 2023-10-25 PROBLEM — I50.9 ACUTE DECOMPENSATED HEART FAILURE (MULTI): Status: ACTIVE | Noted: 2023-10-25

## 2023-10-25 PROBLEM — J84.9 INTERSTITIAL LUNG DISEASE (MULTI): Status: ACTIVE | Noted: 2023-10-25

## 2023-10-25 PROBLEM — H52.209 ASTIGMATISM: Status: ACTIVE | Noted: 2023-10-25

## 2023-10-25 PROBLEM — H52.4 MYOPIA WITH PRESBYOPIA: Status: ACTIVE | Noted: 2023-10-25

## 2023-10-25 PROBLEM — R23.4 FISSURE IN SKIN OF FOOT: Status: ACTIVE | Noted: 2023-10-25

## 2023-10-25 PROBLEM — H52.7 DISORDER OF REFRACTION: Status: ACTIVE | Noted: 2018-03-07

## 2023-10-25 PROBLEM — H40.003 GLAUCOMA SUSPECT OF BOTH EYES: Status: ACTIVE | Noted: 2023-10-25

## 2023-10-25 PROBLEM — R60.0 BILATERAL EDEMA OF LOWER EXTREMITY: Status: ACTIVE | Noted: 2023-10-25

## 2023-10-25 PROBLEM — E78.5 HYPERLIPIDEMIA: Status: ACTIVE | Noted: 2023-10-25

## 2023-10-25 PROBLEM — H04.123 DRY EYES, BILATERAL: Status: ACTIVE | Noted: 2023-10-25

## 2023-10-25 PROBLEM — M79.604 BILATERAL LEG AND FOOT PAIN: Status: ACTIVE | Noted: 2023-10-25

## 2023-10-25 PROBLEM — Z96.1 PSEUDOPHAKIA OF BOTH EYES: Status: ACTIVE | Noted: 2023-10-25

## 2023-10-25 PROBLEM — E11.9 ENCOUNTER FOR DIABETIC FOOT EXAM (MULTI): Status: ACTIVE | Noted: 2018-05-18

## 2023-10-25 PROBLEM — R51.9 FACE PAIN: Status: ACTIVE | Noted: 2017-11-10

## 2023-10-25 PROBLEM — M79.671 BILATERAL LEG AND FOOT PAIN: Status: ACTIVE | Noted: 2023-10-25

## 2023-10-25 PROBLEM — R91.8 LUNG NODULES: Status: ACTIVE | Noted: 2023-10-25

## 2023-10-25 PROBLEM — R94.31 PROLONGED QT INTERVAL: Status: ACTIVE | Noted: 2023-10-25

## 2023-10-25 PROBLEM — M19.079 ARTHRITIS OF FOOT: Status: ACTIVE | Noted: 2023-10-25

## 2023-10-25 PROBLEM — G47.33 OBSTRUCTIVE SLEEP APNEA (ADULT) (PEDIATRIC): Status: ACTIVE | Noted: 2017-03-05

## 2023-10-25 PROBLEM — M20.62 TOE DEFORMITY, LEFT: Status: ACTIVE | Noted: 2023-10-25

## 2023-10-25 PROBLEM — M79.674 PAIN IN TOES OF BOTH FEET: Status: ACTIVE | Noted: 2023-10-25

## 2023-10-25 PROBLEM — I25.10 ATHSCL HEART DISEASE OF NATIVE CORONARY ARTERY W/O ANG PCTRS: Status: ACTIVE | Noted: 2023-10-25

## 2023-10-25 PROBLEM — J45.909 ASTHMA (HHS-HCC): Status: ACTIVE | Noted: 2023-10-25

## 2023-10-25 PROBLEM — H01.006 BLEPHARITIS, BILATERAL: Status: ACTIVE | Noted: 2023-10-25

## 2023-10-25 PROBLEM — M25.522 LEFT ELBOW PAIN: Status: ACTIVE | Noted: 2023-10-25

## 2023-10-25 PROBLEM — H04.123 DRY EYE SYNDROME, BILATERAL: Status: ACTIVE | Noted: 2018-02-06

## 2023-10-25 PROBLEM — L73.2 HIDRADENITIS SUPPURATIVA: Status: ACTIVE | Noted: 2023-10-25

## 2023-10-25 PROBLEM — R06.00 DYSPNEA: Status: ACTIVE | Noted: 2023-10-25

## 2023-10-25 PROBLEM — M79.605 BILATERAL LEG AND FOOT PAIN: Status: ACTIVE | Noted: 2023-10-25

## 2023-10-25 PROBLEM — J96.10 CHRONIC RESPIRATORY FAILURE (MULTI): Status: ACTIVE | Noted: 2023-10-25

## 2023-10-25 PROBLEM — Z86.010 HISTORY OF COLON POLYPS: Status: ACTIVE | Noted: 2023-10-25

## 2023-10-25 PROBLEM — H25.819 COMBINED FORMS OF AGE-RELATED CATARACT: Status: ACTIVE | Noted: 2017-06-26

## 2023-10-25 PROBLEM — H25.813 COMBINED FORM OF AGE-RELATED CATARACT, BOTH EYES: Status: ACTIVE | Noted: 2023-10-25

## 2023-10-25 RX ORDER — CHOLECALCIFEROL (VITAMIN D3) 25 MCG
1 TABLET ORAL DAILY
COMMUNITY

## 2023-10-25 RX ORDER — FLUOCINONIDE TOPICAL SOLUTION USP, 0.05% 0.5 MG/ML
1 SOLUTION TOPICAL 2 TIMES DAILY
COMMUNITY
Start: 2017-04-05

## 2023-10-25 RX ORDER — LATANOPROST 50 UG/ML
SOLUTION/ DROPS OPHTHALMIC
COMMUNITY
Start: 2023-08-18 | End: 2023-10-26 | Stop reason: SDUPTHER

## 2023-10-25 RX ORDER — FLUTICASONE PROPIONATE 50 MCG
2 SPRAY, SUSPENSION (ML) NASAL DAILY
COMMUNITY
Start: 2019-01-22 | End: 2024-03-01 | Stop reason: SDUPTHER

## 2023-10-25 RX ORDER — LYSINE HCL 500 MG
1 TABLET ORAL DAILY
COMMUNITY

## 2023-10-25 RX ORDER — LISINOPRIL 20 MG/1
20 TABLET ORAL DAILY
COMMUNITY

## 2023-10-25 RX ORDER — ACETAMINOPHEN 500 MG
1 TABLET ORAL DAILY
COMMUNITY

## 2023-10-25 RX ORDER — PEN NEEDLE, DIABETIC 29 G X1/2"
NEEDLE, DISPOSABLE MISCELLANEOUS
COMMUNITY
Start: 2023-05-05

## 2023-10-25 RX ORDER — METOPROLOL TARTRATE 50 MG/1
50 TABLET ORAL 2 TIMES DAILY
COMMUNITY
Start: 2015-11-19

## 2023-10-25 RX ORDER — CLOTRIMAZOLE AND BETAMETHASONE DIPROPIONATE 10; .64 MG/G; MG/G
1 CREAM TOPICAL 2 TIMES DAILY
COMMUNITY
Start: 2022-09-20 | End: 2024-02-19 | Stop reason: SDUPTHER

## 2023-10-25 RX ORDER — FLUTICASONE PROPIONATE AND SALMETEROL 250; 50 UG/1; UG/1
1 POWDER RESPIRATORY (INHALATION) 2 TIMES DAILY
COMMUNITY
Start: 2018-08-02

## 2023-10-25 RX ORDER — CLINDAMYCIN PHOSPHATE 10 MG/G
1 GEL TOPICAL 2 TIMES DAILY
COMMUNITY
Start: 2016-01-27

## 2023-10-25 RX ORDER — FUROSEMIDE 20 MG/1
20 TABLET ORAL DAILY
COMMUNITY
Start: 2016-06-20

## 2023-10-25 RX ORDER — BLOOD SUGAR DIAGNOSTIC
STRIP MISCELLANEOUS 2 TIMES DAILY
COMMUNITY
Start: 2018-11-01

## 2023-10-26 ENCOUNTER — OFFICE VISIT (OUTPATIENT)
Dept: OPHTHALMOLOGY | Facility: CLINIC | Age: 78
End: 2023-10-26
Payer: MEDICARE

## 2023-10-26 DIAGNOSIS — H40.1131 PRIMARY OPEN ANGLE GLAUCOMA (POAG) OF BOTH EYES, MILD STAGE: Primary | ICD-10-CM

## 2023-10-26 PROCEDURE — 99214 OFFICE O/P EST MOD 30 MIN: CPT | Performed by: OPHTHALMOLOGY

## 2023-10-26 PROCEDURE — 92083 EXTENDED VISUAL FIELD XM: CPT | Performed by: OPHTHALMOLOGY

## 2023-10-26 RX ORDER — LATANOPROST 50 UG/ML
1 SOLUTION/ DROPS OPHTHALMIC NIGHTLY
Qty: 4.5 ML | Refills: 3 | Status: SHIPPED | OUTPATIENT
Start: 2023-10-26 | End: 2024-04-29

## 2023-10-26 ASSESSMENT — ENCOUNTER SYMPTOMS
PSYCHIATRIC NEGATIVE: 0
MUSCULOSKELETAL NEGATIVE: 0
NEUROLOGICAL NEGATIVE: 0
ENDOCRINE NEGATIVE: 0
GASTROINTESTINAL NEGATIVE: 0
HEMATOLOGIC/LYMPHATIC NEGATIVE: 0
CARDIOVASCULAR NEGATIVE: 0
EYES NEGATIVE: 0
RESPIRATORY NEGATIVE: 0
ALLERGIC/IMMUNOLOGIC NEGATIVE: 0
CONSTITUTIONAL NEGATIVE: 0

## 2023-10-26 ASSESSMENT — TONOMETRY
OS_IOP_MMHG: 20
OD_IOP_MMHG: 21
IOP_METHOD: GOLDMANN APPLANATION

## 2023-10-26 ASSESSMENT — VISUAL ACUITY
CORRECTION_TYPE: GLASSES
OD_CC: 20/20
OS_CC: 20/20
OS_CC+: -2
METHOD: SNELLEN - LINEAR

## 2023-10-26 ASSESSMENT — EXTERNAL EXAM - RIGHT EYE: OD_EXAM: NORMAL

## 2023-10-26 ASSESSMENT — EXTERNAL EXAM - LEFT EYE: OS_EXAM: NORMAL

## 2023-10-26 ASSESSMENT — SLIT LAMP EXAM - LIDS
COMMENTS: NORMAL
COMMENTS: NORMAL

## 2023-10-26 NOTE — PROGRESS NOTES
primary open angle glaucoma, both eyes mild stage   +family hx in father, aunts, no hx of blindness   aa race   thinner pachs   tmax 30/33 (new today, not on any drops)   inhaled/nasal steroids   gonio open   no trauma  Pedro Visual Field - OU - Both Eyes          Right Eye  Threshold was 24-2. Findings include normal observations.     Left Eye  Threshold was 24-2. Findings include normal observations.     Notes  Slightly trigger happy but no evidence of glaucoma OU           oct rnfl with thinning inferiorly OS   C/D asymmetry with OS>OD   long discussion with patient re-pathophysiology of glaucoma, potential blinding nature of disease   s/p SLT 1/22 OS   bblocker contraindicated   Good reduction with brim BID,  but developed allergy   IOP remains at goal <21   continue latan qhs OU   rtc 6 months for DFE/OCT RNFL/OCT MAC       diabetes without retinopathy: recommend yearly exams created by:Tiesha Dolan

## 2023-10-29 DIAGNOSIS — I50.9 HEART FAILURE, UNSPECIFIED HF CHRONICITY, UNSPECIFIED HEART FAILURE TYPE (MULTI): ICD-10-CM

## 2023-10-30 RX ORDER — CARVEDILOL 12.5 MG/1
TABLET ORAL
Qty: 180 TABLET | Refills: 1 | Status: SHIPPED | OUTPATIENT
Start: 2023-10-30 | End: 2024-04-22

## 2023-10-30 RX ORDER — TORSEMIDE 20 MG/1
TABLET ORAL
Qty: 90 TABLET | Refills: 1 | Status: SHIPPED | OUTPATIENT
Start: 2023-10-30 | End: 2024-04-22

## 2023-11-08 DIAGNOSIS — E11.9 TYPE 2 DIABETES MELLITUS WITHOUT COMPLICATION, WITHOUT LONG-TERM CURRENT USE OF INSULIN (MULTI): ICD-10-CM

## 2023-11-29 ENCOUNTER — TELEPHONE (OUTPATIENT)
Dept: PULMONOLOGY | Facility: HOSPITAL | Age: 78
End: 2023-11-29
Payer: MEDICARE

## 2023-11-29 NOTE — TELEPHONE ENCOUNTER
Spoke with pt regarding her appointments on 12/5/23. Pt is to have her PFT's on 12/5/23 at 0800 in [facility] 6th floor. She is then to have her FUV with Dr. Jose on 12/5/23 at 0940 at [facility]. Pt is unable to make the FUV with Dr. Jose due to her testing at [facility] prior. Pt FUV with Dr. Jose on 12/5/23 was rescheduled for 12/6/23 at 1140 at [facility]. Pt confirmed this change and verified date, time and location with read back.

## 2023-12-05 ENCOUNTER — APPOINTMENT (OUTPATIENT)
Dept: PULMONOLOGY | Facility: CLINIC | Age: 78
End: 2023-12-05
Payer: MEDICARE

## 2023-12-05 ENCOUNTER — HOSPITAL ENCOUNTER (OUTPATIENT)
Dept: RESPIRATORY THERAPY | Facility: HOSPITAL | Age: 78
Discharge: HOME | End: 2023-12-05
Payer: MEDICARE

## 2023-12-05 DIAGNOSIS — J84.9 INTERSTITIAL LUNG DISEASE (MULTI): ICD-10-CM

## 2023-12-05 LAB
MGC ASCENT PFT - FEV1 - PRE: 0.98
MGC ASCENT PFT - FEV1 - PREDICTED: 1.73
MGC ASCENT PFT - FVC - PRE: 1.55
MGC ASCENT PFT - FVC - PREDICTED: 2.25

## 2023-12-05 PROCEDURE — 94729 DIFFUSING CAPACITY: CPT | Performed by: INTERNAL MEDICINE

## 2023-12-05 PROCEDURE — 94729 DIFFUSING CAPACITY: CPT

## 2023-12-05 PROCEDURE — 94010 BREATHING CAPACITY TEST: CPT | Performed by: INTERNAL MEDICINE

## 2023-12-06 ENCOUNTER — OFFICE VISIT (OUTPATIENT)
Dept: PULMONOLOGY | Facility: CLINIC | Age: 78
End: 2023-12-06
Payer: MEDICARE

## 2023-12-06 VITALS
HEART RATE: 75 BPM | TEMPERATURE: 96.6 F | SYSTOLIC BLOOD PRESSURE: 117 MMHG | DIASTOLIC BLOOD PRESSURE: 52 MMHG | OXYGEN SATURATION: 100 % | BODY MASS INDEX: 41.72 KG/M2 | WEIGHT: 221 LBS | HEIGHT: 61 IN

## 2023-12-06 DIAGNOSIS — J84.9 INTERSTITIAL LUNG DISEASE (MULTI): ICD-10-CM

## 2023-12-06 DIAGNOSIS — J44.89 ASTHMA-COPD OVERLAP SYNDROME (MULTI): ICD-10-CM

## 2023-12-06 DIAGNOSIS — G47.33 OBSTRUCTIVE SLEEP APNEA (ADULT) (PEDIATRIC): Primary | ICD-10-CM

## 2023-12-06 PROCEDURE — 1126F AMNT PAIN NOTED NONE PRSNT: CPT | Performed by: INTERNAL MEDICINE

## 2023-12-06 PROCEDURE — 1159F MED LIST DOCD IN RCRD: CPT | Performed by: INTERNAL MEDICINE

## 2023-12-06 PROCEDURE — 99214 OFFICE O/P EST MOD 30 MIN: CPT | Performed by: INTERNAL MEDICINE

## 2023-12-06 PROCEDURE — 3074F SYST BP LT 130 MM HG: CPT | Performed by: INTERNAL MEDICINE

## 2023-12-06 PROCEDURE — 3078F DIAST BP <80 MM HG: CPT | Performed by: INTERNAL MEDICINE

## 2023-12-06 RX ORDER — PREDNISONE 20 MG/1
20 TABLET ORAL DAILY
Qty: 5 TABLET | Refills: 0 | Status: SHIPPED | OUTPATIENT
Start: 2023-12-06 | End: 2023-12-11

## 2023-12-06 ASSESSMENT — PAIN SCALES - GENERAL: PAINLEVEL: 0-NO PAIN

## 2023-12-06 NOTE — PROGRESS NOTES
"Interim 12/6/2023 Nov 23 she had an episode upper respiratory infection with worsening shortness of breath and cough lending her an emergency room visit.  She was given doxycycline and benzonatate.  She feels a lot better and her cough is clearing up with continued whitish sputum.  She also describes runny nose.  She is using her Trelegy daily.  She feels about 60% better but she still have shortness of breath.  She uses 2 liters with her CPAP and 2 at rest and 3 when walking    PE: /52   Pulse 75   Temp 35.9 °C (96.6 °F)   Ht 1.549 m (5' 1\")   Wt 100 kg (221 lb)   SpO2 100%   BMI 41.76 kg/m²   Lungs: bilateral wheezing      Interim 06 2023   she is more tired than usual. She is compliant with her CPAP and following up with the sleep doctor. She remains active but not as much because of the fatigue and independent of her activities of daily living.   She is mMRC 3. She uses her oxygen all the time. No fever or chills or night sweats or weight loss or loss of appetite. No sick visits for COPD exacerbation or emergency room visits. No prednisone burst since I last saw her. She does have nocturnal cough but that is not bothering her. Denies reflux and postnasal drip. vaccinated for the flu pneumonia utd   Because of insurance coverage since she switched to the Wixela instead of the Advair and she is currently contemplating getting Spiriva to supplement.     Lungs: diminished BS bilaterally but no wheezes or crackles        interim 12 2 2022   SOB is the same. She is mMRC 3. She continues to be active and independent of her activities of daily living. She uses her oxygen all the time. No fever or chills or night sweats or weight loss or loss of appetite. No sick visits for COPD exacerbation or emergency room visits. No prednisone burst since I last saw her. She does have nocturnal cough but that is not bothering her. Denies reflux and postnasal drip.   vaccinated for the flu   pneumonia utd      Interim " 08 2022   I last saw her in April 2022. Since I last saw her she feels stable She is still independent in her activities of daily living. She uses her oxygen all the time except at night. a nocturnal pulse ox while on CPAP showed desaturation. She lives with her  who just suffered a stroke and she helps him. No COPD exacerbations since I last saw her, no prednisone burst and no ER visits for any COPD issues. She denies any fever or chills or night sweats. she lost about 11lbs since last I saw her. She is compliant with her CPAP and her inhalers      CAT score 22   ACT 15     Interim 04 05 2022  I last saw her in December 2021. Since I last saw her she feels stable except for worsening wheezing and perhaps slight worsening of her cough in the last month or so. She is still independent in her activities of daily living. She uses her oxygen all the time except at night. She lives by herself. No COPD exacerbations since I last saw her, no prednisone burst and no ER visits for any COPD issues. She denies any fever or chills or night sweats or weight loss or loss of appetite. She is compliant with her CPAP.     Initial  76 yo F morbidly obese and past smoker quit in 1989, 20 pack year smoker, CAD, DM2, obesity, AMY, UC, Ashtma-COPD, Chronic respiratory failure 2L exertion since Jan 2020 and sleep and 3L with portable tank when not using CPAP for AMY, Lung nodules, Chronic sinusitis, CAD, HTN, DM2   Since last visit doing OK, able to do most of her ADL;s without assistance. been on oxygen for about 2 years.  No ER visits or hospitalizations in the last 6 months to one year. uses her inhalers regularly. the incruseuse ellipta helped a lot. Have not done her stationary bike in the last month or so. she has some cough not bothersome. no fever or chills or [place] or weight loss. not on chronic prednisone but goes on prednisone couple of times but none since last year      Inhalers:  Advair 500/50 mcg 1 puff twice a day and  rinse after  Albuterol MDI  Incruse 1 time a day   Albuterol neb broken- will give Rx         Social Hx: past smoker as outlined above, used to work for the [employer], retired 2007  No birds, hot tub, molds, toxic exposures- in one of the buildings there was a problem with asbestos- some people she worked with are having lung issues from asbestos- exposed [phone] while working at the [employer] [address] [place] [employer].     Fhx: CAD, no known lung disease, no ILDs     Surgical Hx: Appendectomy, C section     PMHx: UC, Ashtma-COPD, Chronic respiratory failure 2L exertion and sleep and 3L with portable tank when not using CPAP, Lung nodules, Chronic sinusitis, CAD, HTN, DM2         PE: 12 2022  VS noted   CTA mild crackles at the bases         Diag data   Per prior documentation- PFT back in a February 2019 showed moderate obstructive ventilatory defect with increase post bronchodilator response by 20%  PFTs I have personally visualized the most recent pulmonary function testing results.     Date  FEV1/FVC FVC L(%) FEV1 L(%) TLC L(%) RV/TLC DLCO c( %)  1202023 63  1.55  0.98  06 2023 62, 1.54, .96 ; 9.8 (54%)  08/2022 .64, 1.67 (84%) 1 (72%), 11.5 (63%)  11/2021 0.6 1.47(74%) 0.9(56%) 128% 53% 10 (55%)   2/2019 1.48 0.97 (post BD, 20% BD response) 12     Six mn walk test   06 2023; 3 LNC 99%-99% walked about 226m   11 2021 3LNC, 182 m ; 100-99%      Chest CT multiple dating back to 2019 show stable fibrotic changes in an upper and mild lung zones predominance in a pattern suggestive of HP and indeterminant for UI P  CHest CT July stable findings compared to last year   CHest CT from May 2023 stable compeaered to Juen 2021     SABA, RF CCP none  Biopsy : none   Hypersensitivity panel neg   RF slightly up      TTE July 2022    Left Ventricle: The left ventricular systolic function is normal, with an estimated ejection fraction of 65%. There are no regional wall motion abnormalities. The  left ventricular cavity size is normal. Spectral Doppler shows a normal pattern of left ventricular diastolic filling.  Left Atrium: The left atrium is normal in size.  Right Ventricle: The right ventricle is normal in size. There is normal right ventricular global systolic function.  Right Atrium: The right atrium is normal in size.  Aortic Valve: The aortic valve is trileaflet. There is mild aortic valve cusp calcification. There is trace to mild aortic valve regurgitation. The peak instantaneous gradient of the aortic valve is 9.2 mmHg.  Mitral Valve: The mitral valve is normal in structure. There is no evidence of mitral valve regurgitation.  Tricuspid Valve: The tricuspid valve is structurally normal. No evidence of tricuspid regurgitation.  Pulmonic Valve: The pulmonic valve is structurally normal. There is no indication of pulmonic valve regurgitation.  Pericardium: There is no pericardial effusion noted.  Aorta: The aortic root is normal. There is no dilatation of the ascending aorta.  Systemic Veins: The inferior vena cava appears to be of normal size. There is IVC inspiratory collapse greater than 50%.         Provider Impressions and Plan:     This is a 78-year-old female prior smoker but who quit more than 20 years ago with:     1.COPD/Asthma overlap; Moderate obstructive lung disease on pulmonary function test dating back to at least 2019 and that is not any worse over the last couple of years. She previously had a 20% bronchodilator response. She is responding well to LABA, LAMA and ICS therapy. She does have 1-2 COPD exacerbation per year Not requiring hospitalization.  Most recent and in November the last year Group B (CAT =29 and MMRC 3 )  -trelegy   -Utd on vaccines   -Pulm rehab referral   -A course of prednisone given wheezing on exam     2.Diffuse parenchymal lung disease on a chest CT dating back to at least 2019. Per chart review this was also present when she was followed up at [facility] 5 or 6  years ago. She never had a biopsy. The pattern is indeterminate for UIP and suggestive of hypersensitivity pneumonitis. No exposures whatsoever. HP panel neg.  stable clinically. by testing ? a slight decline on todays PFT. HRCT stable --> continue watching and repeat tetsing in 6 monthds      3. Morbid obesity BMI 42        4.AMY  - on CPAP and following with sleep -nocturnal Oximetry on CPAP showed desaturation and we will prescribe O2 to be blended      5. Hypoxemic resp failure   In the setting of #1 and #2  Needs updated 6 mn walk testing before next visit     RTC in 6 months

## 2023-12-06 NOTE — PATIENT INSTRUCTIONS
Dear Katarina Hernandez It was nice seeing you today. We discussed the following:     You were doing well up until recently when you had an acute bronchitis episode treated with doxycycline.  You are still wheezing on exam today and I sent you a prescription for prednisone to take 20 mg/day for 5 days.  Your breathing test is stable compared to June 2023.  Please call the office on Friday and let me know how you are doing.  I ordered a oxygen test to be done before next visit  I will see you back in follow-up in 6 months      For scheduling purposes:    Call [phone] to schedule a breathing or a walking test   Should you have any questions Please Call my assistant Marylu Ford at [phone] or our pulmonary nurse Saloni Walter [phone].

## 2023-12-08 DIAGNOSIS — J30.9 ALLERGIC RHINITIS, UNSPECIFIED SEASONALITY, UNSPECIFIED TRIGGER: ICD-10-CM

## 2023-12-11 ENCOUNTER — HOSPITAL ENCOUNTER (OUTPATIENT)
Dept: RESPIRATORY THERAPY | Facility: HOSPITAL | Age: 78
Discharge: HOME | End: 2023-12-11
Payer: MEDICARE

## 2023-12-11 DIAGNOSIS — J44.89 ASTHMA-COPD OVERLAP SYNDROME (MULTI): ICD-10-CM

## 2023-12-11 DIAGNOSIS — J84.9 INTERSTITIAL LUNG DISEASE (MULTI): ICD-10-CM

## 2023-12-11 DIAGNOSIS — G47.33 OBSTRUCTIVE SLEEP APNEA (ADULT) (PEDIATRIC): ICD-10-CM

## 2023-12-11 PROCEDURE — 94618 PULMONARY STRESS TESTING: CPT

## 2023-12-11 RX ORDER — MONTELUKAST SODIUM 10 MG/1
10 TABLET ORAL DAILY
Qty: 90 TABLET | Refills: 1 | Status: SHIPPED | OUTPATIENT
Start: 2023-12-11 | End: 2024-01-19 | Stop reason: SDUPTHER

## 2024-01-16 ENCOUNTER — APPOINTMENT (OUTPATIENT)
Dept: PODIATRY | Facility: CLINIC | Age: 79
End: 2024-01-16
Payer: MEDICARE

## 2024-01-19 DIAGNOSIS — J30.9 ALLERGIC RHINITIS, UNSPECIFIED SEASONALITY, UNSPECIFIED TRIGGER: ICD-10-CM

## 2024-01-19 RX ORDER — MONTELUKAST SODIUM 10 MG/1
10 TABLET ORAL DAILY
Qty: 90 TABLET | Refills: 0 | Status: SHIPPED | OUTPATIENT
Start: 2024-01-19

## 2024-02-02 ENCOUNTER — TELEPHONE (OUTPATIENT)
Dept: PRIMARY CARE | Facility: CLINIC | Age: 79
End: 2024-02-02
Payer: MEDICARE

## 2024-02-02 NOTE — TELEPHONE ENCOUNTER
Patient will be seeing you on 2/22, she is requesting labs be ut in so she can go over them at visit

## 2024-02-05 DIAGNOSIS — E11.9 TYPE 2 DIABETES MELLITUS WITHOUT COMPLICATIONS (MULTI): ICD-10-CM

## 2024-02-05 DIAGNOSIS — E78.5 HYPERLIPIDEMIA, UNSPECIFIED HYPERLIPIDEMIA TYPE: ICD-10-CM

## 2024-02-05 DIAGNOSIS — J44.9 CHRONIC OBSTRUCTIVE PULMONARY DISEASE, UNSPECIFIED COPD TYPE (MULTI): ICD-10-CM

## 2024-02-05 RX ORDER — ATORVASTATIN CALCIUM 40 MG/1
40 TABLET, FILM COATED ORAL NIGHTLY
Qty: 90 TABLET | Refills: 0 | Status: SHIPPED | OUTPATIENT
Start: 2024-02-05

## 2024-02-05 RX ORDER — HUMAN INSULIN 100 [IU]/ML
35 INJECTION, SUSPENSION SUBCUTANEOUS
Qty: 80 ML | Refills: 0 | Status: SHIPPED | OUTPATIENT
Start: 2024-02-05

## 2024-02-07 DIAGNOSIS — E11.9 TYPE 2 DIABETES MELLITUS WITHOUT COMPLICATION, WITH LONG-TERM CURRENT USE OF INSULIN (MULTI): Primary | ICD-10-CM

## 2024-02-07 DIAGNOSIS — Z79.4 TYPE 2 DIABETES MELLITUS WITHOUT COMPLICATION, WITH LONG-TERM CURRENT USE OF INSULIN (MULTI): Primary | ICD-10-CM

## 2024-02-08 RX ORDER — FLUTICASONE FUROATE, UMECLIDINIUM BROMIDE AND VILANTEROL TRIFENATATE 200; 62.5; 25 UG/1; UG/1; UG/1
1 POWDER RESPIRATORY (INHALATION)
Qty: 60 EACH | Refills: 3 | Status: SHIPPED | OUTPATIENT
Start: 2024-02-08 | End: 2024-06-05 | Stop reason: SDUPTHER

## 2024-02-10 ENCOUNTER — LAB (OUTPATIENT)
Dept: LAB | Facility: LAB | Age: 79
End: 2024-02-10
Payer: MEDICARE

## 2024-02-10 DIAGNOSIS — E11.9 TYPE 2 DIABETES MELLITUS WITHOUT COMPLICATION, WITHOUT LONG-TERM CURRENT USE OF INSULIN (MULTI): ICD-10-CM

## 2024-02-10 DIAGNOSIS — E11.9 TYPE 2 DIABETES MELLITUS WITHOUT COMPLICATION, WITH LONG-TERM CURRENT USE OF INSULIN (MULTI): ICD-10-CM

## 2024-02-10 DIAGNOSIS — Z79.4 TYPE 2 DIABETES MELLITUS WITHOUT COMPLICATION, WITH LONG-TERM CURRENT USE OF INSULIN (MULTI): ICD-10-CM

## 2024-02-10 LAB
CHOLEST SERPL-MCNC: 134 MG/DL (ref 0–199)
CHOLESTEROL/HDL RATIO: 2.2
CREAT UR-MCNC: 90 MG/DL (ref 20–320)
ERYTHROCYTE [DISTWIDTH] IN BLOOD BY AUTOMATED COUNT: 14.6 % (ref 11.5–14.5)
EST. AVERAGE GLUCOSE BLD GHB EST-MCNC: 200 MG/DL
HBA1C MFR BLD: 8.6 %
HCT VFR BLD AUTO: 39.6 % (ref 36–46)
HDLC SERPL-MCNC: 60.9 MG/DL
HGB BLD-MCNC: 12.3 G/DL (ref 12–16)
LDLC SERPL CALC-MCNC: 56 MG/DL
MCH RBC QN AUTO: 27.4 PG (ref 26–34)
MCHC RBC AUTO-ENTMCNC: 31.1 G/DL (ref 32–36)
MCV RBC AUTO: 88 FL (ref 80–100)
MICROALBUMIN UR-MCNC: 12 MG/L
MICROALBUMIN/CREAT UR: 13.3 UG/MG CREAT
NON HDL CHOLESTEROL: 73 MG/DL (ref 0–149)
NRBC BLD-RTO: 0 /100 WBCS (ref 0–0)
PLATELET # BLD AUTO: 216 X10*3/UL (ref 150–450)
RBC # BLD AUTO: 4.49 X10*6/UL (ref 4–5.2)
TRIGL SERPL-MCNC: 88 MG/DL (ref 0–149)
TSH SERPL-ACNC: 1.61 MIU/L (ref 0.44–3.98)
VLDL: 18 MG/DL (ref 0–40)
WBC # BLD AUTO: 10.5 X10*3/UL (ref 4.4–11.3)

## 2024-02-10 PROCEDURE — 85027 COMPLETE CBC AUTOMATED: CPT

## 2024-02-10 PROCEDURE — 84443 ASSAY THYROID STIM HORMONE: CPT

## 2024-02-10 PROCEDURE — 82043 UR ALBUMIN QUANTITATIVE: CPT

## 2024-02-10 PROCEDURE — 83036 HEMOGLOBIN GLYCOSYLATED A1C: CPT

## 2024-02-10 PROCEDURE — 82570 ASSAY OF URINE CREATININE: CPT

## 2024-02-10 PROCEDURE — 36415 COLL VENOUS BLD VENIPUNCTURE: CPT

## 2024-02-10 PROCEDURE — 80053 COMPREHEN METABOLIC PANEL: CPT

## 2024-02-10 PROCEDURE — 80061 LIPID PANEL: CPT

## 2024-02-12 DIAGNOSIS — E11.9 TYPE 2 DIABETES MELLITUS WITHOUT COMPLICATION, WITHOUT LONG-TERM CURRENT USE OF INSULIN (MULTI): Primary | ICD-10-CM

## 2024-02-12 LAB
ALBUMIN SERPL BCP-MCNC: 3.7 G/DL (ref 3.4–5)
ALP SERPL-CCNC: 85 U/L (ref 33–136)
ALT SERPL W P-5'-P-CCNC: 15 U/L (ref 7–45)
ANION GAP SERPL CALC-SCNC: 16 MMOL/L (ref 10–20)
AST SERPL W P-5'-P-CCNC: 15 U/L (ref 9–39)
BILIRUB SERPL-MCNC: 0.3 MG/DL (ref 0–1.2)
BUN SERPL-MCNC: 22 MG/DL (ref 6–23)
CALCIUM SERPL-MCNC: 10 MG/DL (ref 8.6–10.6)
CHLORIDE SERPL-SCNC: 102 MMOL/L (ref 98–107)
CO2 SERPL-SCNC: 31 MMOL/L (ref 21–32)
CREAT SERPL-MCNC: 0.99 MG/DL (ref 0.5–1.05)
EGFRCR SERPLBLD CKD-EPI 2021: 58 ML/MIN/1.73M*2
GLUCOSE SERPL-MCNC: 133 MG/DL (ref 74–99)
POTASSIUM SERPL-SCNC: 4.2 MMOL/L (ref 3.5–5.3)
PROT SERPL-MCNC: 7 G/DL (ref 6.4–8.2)
SODIUM SERPL-SCNC: 145 MMOL/L (ref 136–145)

## 2024-02-19 ENCOUNTER — PROCEDURE VISIT (OUTPATIENT)
Dept: PODIATRY | Facility: CLINIC | Age: 79
End: 2024-02-19
Payer: MEDICARE

## 2024-02-19 DIAGNOSIS — G62.9 NEUROPATHY: ICD-10-CM

## 2024-02-19 DIAGNOSIS — M19.079 ARTHRITIS, MIDFOOT: ICD-10-CM

## 2024-02-19 DIAGNOSIS — B35.3 TINEA PEDIS OF BOTH FEET: Primary | ICD-10-CM

## 2024-02-19 DIAGNOSIS — B35.1 ONYCHOMYCOSIS: ICD-10-CM

## 2024-02-19 DIAGNOSIS — M79.672 FOOT PAIN, BILATERAL: ICD-10-CM

## 2024-02-19 DIAGNOSIS — M79.671 FOOT PAIN, BILATERAL: ICD-10-CM

## 2024-02-19 PROCEDURE — 99214 OFFICE O/P EST MOD 30 MIN: CPT | Performed by: PODIATRIST

## 2024-02-19 RX ORDER — CLOTRIMAZOLE AND BETAMETHASONE DIPROPIONATE 10; .64 MG/G; MG/G
1 CREAM TOPICAL 2 TIMES DAILY
Qty: 45 G | Refills: 3 | Status: SHIPPED | OUTPATIENT
Start: 2024-02-19 | End: 2024-04-25

## 2024-02-19 NOTE — PROGRESS NOTES
History of Present Illness:   Patient states they are here for nails as patient states she is unable to safely trim on her own  Patient also states the cream she has cleared up her prior rash  Needs a refill  States she has NTB to feet, it does not wake her at night.     Past Medical History  Past Medical History:   Diagnosis Date    Combined forms of age-related cataract, left eye 07/22/2019    Combined form of age-related cataract, left eye    Combined forms of age-related cataract, right eye 08/30/2019    Combined form of age-related cataract, right eye    Lesion of ulnar nerve, unspecified upper limb     Ulnar neuropathy    Personal history of colonic polyps     History of colonic polyps    Transient cerebral ischemic attack, unspecified     TIA (transient ischemic attack)    Unspecified acute conjunctivitis, bilateral 01/27/2020    Acute conjunctivitis of both eyes    Unspecified cataract     Cataracts, both eyes    Viral conjunctivitis, unspecified 11/08/2019    Acute viral conjunctivitis of right eye       Medications and Allergies have been reviewed.    Review Of Systems:  GENERAL: No weight loss, malaise or fevers.  HEENT: Negative for frequent or significant headaches,   RESPIRATORY: Negative for cough, wheezing or shortness of breath.  CARDIOVASCULAR: Negative for chest pain, leg swelling or palpitations.    Examination of Both Lower Extremities:   Objective:   Vasc: DP and PT pulses are palpable bilateral.  CFT is less than 3 seconds bilateral.  Skin temperature is warm to cool proximal to distal bilateral.      Neuro: Vibratory, light touch and proprioception are intact bilateral.     Derm: Nails 1-5 bilateral are elongated.  Skin is supple with normal texture and turgor noted.  Webspaces are clean, dry and intact bilateral.      Ortho: Muscle strength is 5/5 for all pedal groups tested.  Ankle joint, subtalar joint, 1st MPJ and lesser MPJ ROM is full and without pain or crepitus.  Pain to midfoot ROM.  No edema, erythema ecchymosis     1. Tinea pedis of both feet  clotrimazole-betamethasone (Lotrisone) cream      2. Foot pain, bilateral        3. Neuropathy        4. Onychomycosis        5. Arthritis, midfoot            Patient educated on proper foot care.  Nails 1-5 b/l were debrided in thickness and length with nail cutting forceps.  Use cream for rash prn. Area looks good as of now  Called in refull  Discussed neuropathy , decided no treatment needed at this time   Discussed right foot pain, discussed osteoarth. Discussed stiff shoe gear. Minimize walking barefoot. 80% of time in stiff shoes.   Patient to follow up in 6 mos or sooner if any problems arise.   Patient was in agreement to this plan. All questions answered.      Melida Vega DPM  [phone]  Option 2  Fax: [phone]

## 2024-02-22 ENCOUNTER — OFFICE VISIT (OUTPATIENT)
Dept: PRIMARY CARE | Facility: CLINIC | Age: 79
End: 2024-02-22
Payer: MEDICARE

## 2024-02-22 ENCOUNTER — HOSPITAL ENCOUNTER (OUTPATIENT)
Dept: RADIOLOGY | Facility: CLINIC | Age: 79
Discharge: HOME | End: 2024-02-22
Payer: MEDICARE

## 2024-02-22 VITALS
TEMPERATURE: 98 F | BODY MASS INDEX: 42.1 KG/M2 | OXYGEN SATURATION: 96 % | WEIGHT: 223 LBS | SYSTOLIC BLOOD PRESSURE: 144 MMHG | DIASTOLIC BLOOD PRESSURE: 82 MMHG | HEIGHT: 61 IN | HEART RATE: 69 BPM | RESPIRATION RATE: 16 BRPM

## 2024-02-22 DIAGNOSIS — Z79.4 TYPE 2 DIABETES MELLITUS WITHOUT COMPLICATION, WITH LONG-TERM CURRENT USE OF INSULIN (MULTI): Primary | ICD-10-CM

## 2024-02-22 DIAGNOSIS — M54.50 ACUTE MIDLINE LOW BACK PAIN WITHOUT SCIATICA: ICD-10-CM

## 2024-02-22 DIAGNOSIS — M25.511 ACUTE PAIN OF RIGHT SHOULDER: ICD-10-CM

## 2024-02-22 DIAGNOSIS — E11.9 TYPE 2 DIABETES MELLITUS WITHOUT COMPLICATION, WITH LONG-TERM CURRENT USE OF INSULIN (MULTI): Primary | ICD-10-CM

## 2024-02-22 DIAGNOSIS — M25.551 BILATERAL HIP PAIN: ICD-10-CM

## 2024-02-22 DIAGNOSIS — M25.552 BILATERAL HIP PAIN: ICD-10-CM

## 2024-02-22 PROCEDURE — 73523 X-RAY EXAM HIPS BI 5/> VIEWS: CPT | Mod: BILATERAL PROCEDURE | Performed by: RADIOLOGY

## 2024-02-22 PROCEDURE — 73030 X-RAY EXAM OF SHOULDER: CPT | Mod: RIGHT SIDE | Performed by: RADIOLOGY

## 2024-02-22 PROCEDURE — 3077F SYST BP >= 140 MM HG: CPT | Performed by: STUDENT IN AN ORGANIZED HEALTH CARE EDUCATION/TRAINING PROGRAM

## 2024-02-22 PROCEDURE — 73030 X-RAY EXAM OF SHOULDER: CPT | Mod: RT

## 2024-02-22 PROCEDURE — G0439 PPPS, SUBSEQ VISIT: HCPCS | Performed by: STUDENT IN AN ORGANIZED HEALTH CARE EDUCATION/TRAINING PROGRAM

## 2024-02-22 PROCEDURE — 3079F DIAST BP 80-89 MM HG: CPT | Performed by: STUDENT IN AN ORGANIZED HEALTH CARE EDUCATION/TRAINING PROGRAM

## 2024-02-22 PROCEDURE — 73521 X-RAY EXAM HIPS BI 2 VIEWS: CPT

## 2024-02-22 PROCEDURE — 72100 X-RAY EXAM L-S SPINE 2/3 VWS: CPT | Performed by: RADIOLOGY

## 2024-02-22 PROCEDURE — 72100 X-RAY EXAM L-S SPINE 2/3 VWS: CPT

## 2024-02-22 PROCEDURE — 99214 OFFICE O/P EST MOD 30 MIN: CPT | Performed by: STUDENT IN AN ORGANIZED HEALTH CARE EDUCATION/TRAINING PROGRAM

## 2024-02-22 PROCEDURE — 1170F FXNL STATUS ASSESSED: CPT | Performed by: STUDENT IN AN ORGANIZED HEALTH CARE EDUCATION/TRAINING PROGRAM

## 2024-02-22 ASSESSMENT — PATIENT HEALTH QUESTIONNAIRE - PHQ9
SUM OF ALL RESPONSES TO PHQ9 QUESTIONS 1 AND 2: 0
2. FEELING DOWN, DEPRESSED OR HOPELESS: NOT AT ALL
1. LITTLE INTEREST OR PLEASURE IN DOING THINGS: NOT AT ALL

## 2024-02-22 ASSESSMENT — ACTIVITIES OF DAILY LIVING (ADL)
DOING_HOUSEWORK: INDEPENDENT
TAKING_MEDICATION: INDEPENDENT
GROCERY_SHOPPING: INDEPENDENT
MANAGING_FINANCES: INDEPENDENT
DRESSING: INDEPENDENT
BATHING: INDEPENDENT

## 2024-02-22 NOTE — PROGRESS NOTES
Subjective   Patient ID: Katarina Hernandez is a 78 y.o. female who presents for Medicare Annual Wellness Visit Subsequent.  HPI  Patient is 78 years old, patient of Dr. Muñoz is here for her Medicare annual wellness visit.  Her medical history and medications include  #1 hypertension on carvedilol 12.5 twice daily and lisinopril 5 mg  2.  HPF-as above and torsemide  3.  COPD/interstitial lung disease on continuous oxygen, Advair and Incruse Ellipta.  4.  GERD/ulcerative colitis-on pantoprazole-chronic use balsalazide following up with gastroenterology  5.  Hyperlipidemia on atorvastatin 40  6.  On Plavix for coronary artery disease  7.  Obstructive sleep apnea on PAP  8.  Diabetes mellitus  A1c elevated  Reports to be compliant with medications  Agreeable to start Januvia 50 mg.  Can increase to 100 mg based on tolerance in a week.    -novolin 42/47 BID -> reduced to 35/35, fewer hypoglycemic episodes  S/e with metformin   - Freestyle   -c/w jardiance 10  -follows with ophthal and podiatry  #HM   -cscope 11.22 for UC, following up with GI  -shingrix x2   -tdap 2/17/2023  -prevnar 2/17/2023   -pneumovax utd  -mammo 8.31.22 , has been every other year, ordered    Complains of ongoing right shoulder pain, back pain and bilateral hip pain.  Reports mild to moderate intensity.  No trauma.  Nonradiating.  Aggravated by movement and relieved by rest      Past Medical History:   Diagnosis Date    Combined forms of age-related cataract, left eye 07/22/2019    Combined form of age-related cataract, left eye    Combined forms of age-related cataract, right eye 08/30/2019    Combined form of age-related cataract, right eye    Lesion of ulnar nerve, unspecified upper limb     Ulnar neuropathy    Personal history of colonic polyps     History of colonic polyps    Transient cerebral ischemic attack, unspecified     TIA (transient ischemic attack)    Unspecified acute conjunctivitis, bilateral 01/27/2020    Acute conjunctivitis of  both eyes    Unspecified cataract     Cataracts, both eyes    Viral conjunctivitis, unspecified 11/08/2019    Acute viral conjunctivitis of right eye      Past Surgical History:   Procedure Laterality Date    MR HEAD ANGIO WO IV CONTRAST  1/19/2019    MR HEAD ANGIO WO IV CONTRAST 1/19/2019 [facility]    MR NECK ANGIO WO IV CONTRAST  1/19/2019    MR NECK ANGIO WO IV CONTRAST 1/19/2019 [facility]    OTHER SURGICAL HISTORY  01/28/2019    Appendectomy    OTHER SURGICAL HISTORY  01/14/2020    Colonoscopy    OTHER SURGICAL HISTORY  01/14/2020    Esophagogastroduodenoscopy    OTHER SURGICAL HISTORY  09/09/2019    Cataract surgery      Family History   Problem Relation Name Age of Onset    Other (CVA) Mother      Coronary artery disease Mother      Hypertension Mother      Glaucoma Father      Hypertension Father      Multiple myeloma Sister      Diabetes Brother      Hypertension Brother      Diabetes Daughter      Macular degeneration Mother's Sister      Diabetes Father's Sister      Diabetes Father's Brother        Allergies   Allergen Reactions    Ciprofloxacin GI Upset and Nausea Only    Clindamycin Nausea And Vomiting    Penicillins Rash and Hives    Fluocinolone Unknown    Latex Hives    Quinolones Nausea And Vomiting and Nausea/vomiting    Streptomycin Unknown    Cimetidine Nausea And Vomiting    Erythromycin Nausea Only and Rash    Sulfa (Sulfonamide Antibiotics) Nausea And Vomiting and Unknown          Occupation:     Review of Systems   Constitutional:  Negative for activity change and fever.   HENT:  Negative for congestion.    Respiratory:  Negative for cough, shortness of breath and wheezing.    Cardiovascular:  Negative for chest pain and leg swelling.   Gastrointestinal:  Negative for abdominal pain, constipation, nausea and vomiting.   Endocrine: Negative for cold intolerance.   Genitourinary:  Negative for dysuria, hematuria and urgency.   Neurological:  Negative for dizziness, speech  "difficulty, weakness and numbness.   Psychiatric/Behavioral:  Negative for self-injury and suicidal ideas.        Objective   Visit Vitals  /82   Pulse 69   Temp 36.7 °C (98 °F)   Resp 16   Ht 1.549 m (5' 1\")   Wt 101 kg (223 lb)   SpO2 96%   BMI 42.14 kg/m²   Smoking Status Never Assessed   BSA 2.08 m²      Physical Exam  Constitutional:       Appearance: Normal appearance.   HENT:      Head: Normocephalic and atraumatic.      Nose: Nose normal.      Mouth/Throat:      Mouth: Mucous membranes are moist.   Eyes:      Conjunctiva/sclera: Conjunctivae normal.      Pupils: Pupils are equal, round, and reactive to light.   Cardiovascular:      Rate and Rhythm: Normal rate and regular rhythm.      Pulses: Normal pulses.      Heart sounds: Normal heart sounds.   Pulmonary:      Effort: Pulmonary effort is normal.      Breath sounds: Normal breath sounds.   Musculoskeletal:      Right shoulder: No deformity, effusion, tenderness, bony tenderness or crepitus. Decreased range of motion.      Left shoulder: Normal range of motion.      Cervical back: Neck supple.      Right hip: No deformity, lacerations, tenderness, bony tenderness or crepitus. Decreased range of motion.      Left hip: No deformity, lacerations, tenderness, bony tenderness or crepitus. Decreased range of motion. Normal strength.   Skin:     General: Skin is warm.   Neurological:      General: No focal deficit present.      Mental Status: She is alert and oriented to person, place, and time.   Psychiatric:         Mood and Affect: Mood normal.         Behavior: Behavior normal.         Thought Content: Thought content normal.         Judgment: Judgment normal.       no point tenderness along palpation of entire spine  no paraspinal tenderness  SLR negative  neurological exam for motor, sensory and reflexes normal and equal bilaterlly  no f/o saddle anaesthesia    Assessment/Plan   Diagnoses and all orders for this visit:  Type 2 diabetes mellitus " without complication, with long-term current use of insulin (CMS/[facility])  -     SITagliptin phosphate (Januvia) 50 mg tablet; Take 1 tablet (50 mg) by mouth once daily.  Acute midline low back pain without sciatica  -     XR lumbar spine 2-3 views; Future  -     Referral to Physical Therapy; Future  Bilateral hip pain  -     XR hips bilateral 2 VW w pelvis when performed; Future  -     Referral to Physical Therapy; Future  Acute pain of right shoulder  -     XR shoulder right 2+ views; Future  -     Referral to Physical Therapy; Future

## 2024-03-01 DIAGNOSIS — J45.909 ASTHMA, UNSPECIFIED ASTHMA SEVERITY, UNSPECIFIED WHETHER COMPLICATED, UNSPECIFIED WHETHER PERSISTENT (HHS-HCC): ICD-10-CM

## 2024-03-01 RX ORDER — FLUTICASONE PROPIONATE 50 MCG
2 SPRAY, SUSPENSION (ML) NASAL DAILY
Qty: 48 G | Refills: 0 | Status: SHIPPED | OUTPATIENT
Start: 2024-03-01

## 2024-03-05 ENCOUNTER — APPOINTMENT (OUTPATIENT)
Dept: PODIATRY | Facility: CLINIC | Age: 79
End: 2024-03-05
Payer: MEDICARE

## 2024-03-08 DIAGNOSIS — Z00.00 HEALTH CARE MAINTENANCE: Primary | ICD-10-CM

## 2024-03-08 DIAGNOSIS — Z12.31 ENCOUNTER FOR SCREENING MAMMOGRAM FOR MALIGNANT NEOPLASM OF BREAST: ICD-10-CM

## 2024-03-15 ASSESSMENT — ENCOUNTER SYMPTOMS
CONSTIPATION: 0
HEMATURIA: 0
NAUSEA: 0
SPEECH DIFFICULTY: 0
SHORTNESS OF BREATH: 0
VOMITING: 0
ABDOMINAL PAIN: 0
WEAKNESS: 0
COUGH: 0
DIZZINESS: 0
DYSURIA: 0
ACTIVITY CHANGE: 0
WHEEZING: 0
NUMBNESS: 0
FEVER: 0

## 2024-04-01 ENCOUNTER — EVALUATION (OUTPATIENT)
Dept: PHYSICAL THERAPY | Facility: CLINIC | Age: 79
End: 2024-04-01
Payer: MEDICARE

## 2024-04-01 DIAGNOSIS — M25.511 ACUTE PAIN OF RIGHT SHOULDER: ICD-10-CM

## 2024-04-01 DIAGNOSIS — M25.552 BILATERAL HIP PAIN: ICD-10-CM

## 2024-04-01 DIAGNOSIS — M54.50 ACUTE MIDLINE LOW BACK PAIN WITHOUT SCIATICA: ICD-10-CM

## 2024-04-01 DIAGNOSIS — M25.551 BILATERAL HIP PAIN: ICD-10-CM

## 2024-04-01 PROCEDURE — 97163 PT EVAL HIGH COMPLEX 45 MIN: CPT | Mod: GP | Performed by: PHYSICAL THERAPIST

## 2024-04-01 PROCEDURE — 97110 THERAPEUTIC EXERCISES: CPT | Mod: GP | Performed by: PHYSICAL THERAPIST

## 2024-04-01 ASSESSMENT — ENCOUNTER SYMPTOMS
OCCASIONAL FEELINGS OF UNSTEADINESS: 0
DEPRESSION: 0
LOSS OF SENSATION IN FEET: 1

## 2024-04-01 NOTE — PROGRESS NOTES
Physical Therapy    Physical Therapy Evaluation and Treatment      Patient Name: Katarina Hernandez  MRN: [id number]  Today's Date: 4/1/2024  Visit: 1  Insurance: Reviewed  Physician: Marlo Contreras  PT Evaluation Time Entry  PT Evaluation (Complex) Time Entry: 30  PT Therapeutic Procedures Time Entry  Therapeutic Exercise Time Entry: 10  Time Calculation  Start Time: 0905  Stop Time: 0945  Time Calculation (min): 40 min    Assessment: Patient seen in PT for Initial Evaluation for back and shoulder pain.   Patient presents with postural deviation  decreased shoulder  ROM , decreased shoulder strength, shoulder tenderness, positive impingement.  Functionally, patient  unable to lift heavy items with right arm.  Patient rates quickdash at 33%.  Evaluate back and hip pain next visit secondary to time constraints.           Plan:  Continue with POC  Shoulder pulley, shoulder ROM/strength, PRE, HP, evaluate back and hip pain  OP PT Plan  PT Plan: Skilled PT  PT Frequency: 1 time per week  Duration: 8  Certification Period Start Date: 07/01/24  Certification Period End Date: 07/01/24  Rehab Potential: Good  Plan of Care Agreement: Patient    Current Problem:   1. Acute midline low back pain without sciatica  Referral to Physical Therapy    Follow Up In Physical Therapy      2. Bilateral hip pain  Referral to Physical Therapy    Follow Up In Physical Therapy      3. Acute pain of right shoulder  Referral to Physical Therapy    Follow Up In Physical Therapy          Subjective    General: Patient with a history of right shoulder for the last 2 months.  Onset was insidious - fell off steps and injured shoulder 30 years ago.  No treatment.  Patient complains of pain in the region of the right prox deltoid, ache pain, 3-4/10 at worst last 7 days.  Patient's pain is worse with lifting, moving arm, and sleeping on shoulder  .  Functionally, patient is unable to do any heavy lifting.       Precautions: DM, HTN, asthma, hx  of TIA  Precautions  STEADI Fall Risk Score (The score of 4 or more indicates an increased risk of falling): 2     Prior Level of Function: No limits        Objective     Postural deviation: rounded shoulders, FWD head  right Shoulder ROM to 120 flexion, 105 abduction, 45 er, and HBB to LS junction  right Shoulder strength 3-/5  flex, 3-/5 abduction, 3-/5  ER, and 4/5  IR  Special tests: positive impingement  Patient tender to palpation at the ant lateral right shoulder     Outcome Measures:  Other Measures  Disability of Arm Shoulder Hand (DASH): 33%     Treatments:  Patient instructed in HEP including: Codman's 10 each, wand assisted scaption and extension 10 each, and resisted shoulder ext, add and ir with red theraband (10 minutes)      EDUCATION: HEP       Goals:  Active       PT Problem       PT Goal 1       Start:  04/01/24    Expected End:  07/01/24       Shoulder pain 2/10 or less         PT Goal 2       Start:  04/01/24    Expected End:  07/01/24       Improve quickdash by 15%         PT Goal 3       Start:  04/01/24    Expected End:  07/01/24       Shoulder ROM elevation 140, er 60, HBB to L3         PT Goal 4       Start:  04/01/24    Expected End:  07/01/24       Shoulder strength 3+-5/5

## 2024-04-02 ENCOUNTER — HOSPITAL ENCOUNTER (OUTPATIENT)
Dept: RADIOLOGY | Facility: CLINIC | Age: 79
Discharge: HOME | End: 2024-04-02
Payer: MEDICARE

## 2024-04-02 VITALS — HEIGHT: 61 IN | WEIGHT: 222.66 LBS | BODY MASS INDEX: 42.04 KG/M2

## 2024-04-02 DIAGNOSIS — Z12.31 ENCOUNTER FOR SCREENING MAMMOGRAM FOR MALIGNANT NEOPLASM OF BREAST: ICD-10-CM

## 2024-04-02 DIAGNOSIS — Z00.00 HEALTH CARE MAINTENANCE: ICD-10-CM

## 2024-04-02 PROCEDURE — 77067 SCR MAMMO BI INCL CAD: CPT

## 2024-04-02 PROCEDURE — 77063 BREAST TOMOSYNTHESIS BI: CPT | Performed by: RADIOLOGY

## 2024-04-02 PROCEDURE — 77067 SCR MAMMO BI INCL CAD: CPT | Performed by: RADIOLOGY

## 2024-04-18 ENCOUNTER — TREATMENT (OUTPATIENT)
Dept: PHYSICAL THERAPY | Facility: CLINIC | Age: 79
End: 2024-04-18
Payer: MEDICARE

## 2024-04-18 DIAGNOSIS — M25.511 ACUTE PAIN OF RIGHT SHOULDER: ICD-10-CM

## 2024-04-18 DIAGNOSIS — M25.551 BILATERAL HIP PAIN: ICD-10-CM

## 2024-04-18 DIAGNOSIS — M54.50 ACUTE MIDLINE LOW BACK PAIN WITHOUT SCIATICA: ICD-10-CM

## 2024-04-18 DIAGNOSIS — M25.552 BILATERAL HIP PAIN: ICD-10-CM

## 2024-04-18 PROCEDURE — 97110 THERAPEUTIC EXERCISES: CPT | Mod: GP | Performed by: PHYSICAL THERAPIST

## 2024-04-18 NOTE — PROGRESS NOTES
Physical Therapy    Physical Therapy Treatment    Patient Name: Katarina Hernandez  MRN: [id number]  Today's Date: 4/18/2024  Time Calculation  Start Time: 0815  Stop Time: 0900  Time Calculation (min): 45 minVisit: 2/8  Insurance: Medicare A+ B  Authorization: 4/1/24-7/1/24  PT Therapeutic Procedures Time Entry  Therapeutic Exercise Time Entry: 40    Assessment: Good understanding and compliance with HEP.  Patient started of gentle back stretches without pain or difficulty.       Plan: Continue with POC       Current Problem  1. Acute midline low back pain without sciatica  Follow Up In Physical Therapy      2. Bilateral hip pain  Follow Up In Physical Therapy      3. Acute pain of right shoulder  Follow Up In Physical Therapy          General      Shoulder pain 4-5/10 at worst last 2 days  Shoulder pain with ADL's   Back pain for the last 6 months   Treated with prednisone with relief  Pain in the region of ls junction - pl hamstring - ache - 3/10 at worst last 2 days   Pain in the am - works out quickly - hurts bending in the am       Subjective    Precautions: DM, HTN, asthma, hx of TIA - working on getting DM and HTN under control        Objective     Treatments:  SciFit stepper 5 minutes  Ball rolls on table 20X each  Reviewed HEP   HLR and PT 10-20X each   Chest press, flies and triceps with 1# dumbbells 10X   Pulleys 5 minutes  (40 minutes)        OP EDUCATION: HEP       Goals:

## 2024-04-22 DIAGNOSIS — I50.9 HEART FAILURE, UNSPECIFIED HF CHRONICITY, UNSPECIFIED HEART FAILURE TYPE (MULTI): ICD-10-CM

## 2024-04-22 DIAGNOSIS — E11.9 TYPE 2 DIABETES MELLITUS WITHOUT COMPLICATION, WITHOUT LONG-TERM CURRENT USE OF INSULIN (MULTI): ICD-10-CM

## 2024-04-22 RX ORDER — TORSEMIDE 20 MG/1
TABLET ORAL
Qty: 90 TABLET | Refills: 1 | Status: SHIPPED | OUTPATIENT
Start: 2024-04-22

## 2024-04-22 RX ORDER — CARVEDILOL 12.5 MG/1
TABLET ORAL
Qty: 180 TABLET | Refills: 1 | Status: SHIPPED | OUTPATIENT
Start: 2024-04-22

## 2024-04-23 DIAGNOSIS — B35.3 TINEA PEDIS OF BOTH FEET: ICD-10-CM

## 2024-04-25 RX ORDER — CLOTRIMAZOLE AND BETAMETHASONE DIPROPIONATE 10; .64 MG/G; MG/G
CREAM TOPICAL 2 TIMES DAILY
Qty: 45 G | Refills: 3 | Status: SHIPPED | OUTPATIENT
Start: 2024-04-25

## 2024-04-29 DIAGNOSIS — H40.1131 PRIMARY OPEN ANGLE GLAUCOMA (POAG) OF BOTH EYES, MILD STAGE: ICD-10-CM

## 2024-04-29 RX ORDER — LATANOPROST 50 UG/ML
1 SOLUTION/ DROPS OPHTHALMIC NIGHTLY
Qty: 15 ML | Refills: 1 | Status: SHIPPED | OUTPATIENT
Start: 2024-04-29 | End: 2025-04-29

## 2024-04-30 ENCOUNTER — TREATMENT (OUTPATIENT)
Dept: PHYSICAL THERAPY | Facility: CLINIC | Age: 79
End: 2024-04-30
Payer: MEDICARE

## 2024-04-30 DIAGNOSIS — M25.511 ACUTE PAIN OF RIGHT SHOULDER: ICD-10-CM

## 2024-04-30 DIAGNOSIS — M25.552 BILATERAL HIP PAIN: ICD-10-CM

## 2024-04-30 DIAGNOSIS — M25.551 BILATERAL HIP PAIN: ICD-10-CM

## 2024-04-30 DIAGNOSIS — M54.50 ACUTE MIDLINE LOW BACK PAIN WITHOUT SCIATICA: ICD-10-CM

## 2024-04-30 PROCEDURE — 97110 THERAPEUTIC EXERCISES: CPT | Mod: GP,CQ

## 2024-04-30 ASSESSMENT — PAIN SCALES - GENERAL: PAINLEVEL_OUTOF10: 0 - NO PAIN

## 2024-04-30 ASSESSMENT — PAIN - FUNCTIONAL ASSESSMENT: PAIN_FUNCTIONAL_ASSESSMENT: 0-10

## 2024-04-30 NOTE — PROGRESS NOTES
Physical Therapy    Physical Therapy Treatment    Patient Name: Katarina Hernandez  MRN: [id number]  Today's Date: 4/30/2024  Time Calculation  Start Time: 0230  Stop Time: 0315  Time Calculation (min): 45 minVisit: 3/8  Insurance: Medicare A+ B  Authorization: 4/1/24-7/1/24  PT Therapeutic Procedures Time Entry  Therapeutic Exercise Time Entry: 45    Assessment:   PT Assessment  Evaluation/Treatment Tolerance: Patient tolerated treatment well  Patient demonstrates good exercise carry over from previous session. Observed more tightness in the left hamstring compared to the right. Responds well to new and repeated exercises today without any negative response. Finishes the session with currently no pain.     Plan: Continue with POC     Current Problem  1. Acute midline low back pain without sciatica  Follow Up In Physical Therapy      2. Bilateral hip pain  Follow Up In Physical Therapy      3. Acute pain of right shoulder  Follow Up In Physical Therapy      General        Subjective    Feeling better since she was last seen by PT, has not needed to take pain medication, feels her exercises have been helping. Performs HEP twice daily. No other updates at this time.     Precautions: DM, HTN, asthma, hx of TIA - working on getting DM and HTN under control        Objective     Treatments:  SciFit stepper 5 minutes  HLR and PT 10-20X each   Strap assisted hamstring stretching 2 x 15 sec  Hip adduction 20 x 5 sec  Hip abduction x 20    Ball rolls on table 20X each  Chest press, flies and triceps with 1# dumbbells 10X   Cane bilateral alternating ER/IR x 20  Small range 1# Abd x 20  Pulleys x 5 minutes  (40 minutes)    OP EDUCATION: HEP     Goals:  Active       PT Problem       PT Goal 1       Start:  04/01/24    Expected End:  07/01/24       Shoulder pain 2/10 or less         PT Goal 2       Start:  04/01/24    Expected End:  07/01/24       Improve quickdash by 15%         PT Goal 3       Start:  04/01/24    Expected End:   07/01/24       Shoulder ROM elevation 140, er 60, HBB to L3         PT Goal 4       Start:  04/01/24    Expected End:  07/01/24       Shoulder strength 3+-5/5

## 2024-05-13 ENCOUNTER — APPOINTMENT (OUTPATIENT)
Dept: OPHTHALMOLOGY | Facility: CLINIC | Age: 79
End: 2024-05-13
Payer: MEDICARE

## 2024-05-13 DIAGNOSIS — E11.9 TYPE 2 DIABETES MELLITUS WITHOUT COMPLICATIONS (MULTI): ICD-10-CM

## 2024-05-13 RX ORDER — LISINOPRIL 5 MG/1
5 TABLET ORAL DAILY
Qty: 90 TABLET | Refills: 3 | Status: SHIPPED | OUTPATIENT
Start: 2024-05-13

## 2024-05-16 ENCOUNTER — OFFICE VISIT (OUTPATIENT)
Dept: OPHTHALMOLOGY | Facility: CLINIC | Age: 79
End: 2024-05-16
Payer: MEDICARE

## 2024-05-16 DIAGNOSIS — H40.1131 PRIMARY OPEN ANGLE GLAUCOMA (POAG) OF BOTH EYES, MILD STAGE: Primary | ICD-10-CM

## 2024-05-16 PROCEDURE — 92134 CPTRZ OPH DX IMG PST SGM RTA: CPT | Mod: BILATERAL PROCEDURE | Performed by: OPHTHALMOLOGY

## 2024-05-16 PROCEDURE — 99214 OFFICE O/P EST MOD 30 MIN: CPT | Performed by: OPHTHALMOLOGY

## 2024-05-16 ASSESSMENT — CONF VISUAL FIELD
OS_NORMAL: 1
OD_SUPERIOR_NASAL_RESTRICTION: 0
OD_INFERIOR_TEMPORAL_RESTRICTION: 0
OS_SUPERIOR_TEMPORAL_RESTRICTION: 0
OS_SUPERIOR_NASAL_RESTRICTION: 0
OD_SUPERIOR_TEMPORAL_RESTRICTION: 0
OD_NORMAL: 1
OS_INFERIOR_TEMPORAL_RESTRICTION: 0
OD_INFERIOR_NASAL_RESTRICTION: 0
OS_INFERIOR_NASAL_RESTRICTION: 0

## 2024-05-16 ASSESSMENT — ENCOUNTER SYMPTOMS
MUSCULOSKELETAL NEGATIVE: 0
HEMATOLOGIC/LYMPHATIC NEGATIVE: 0
RESPIRATORY NEGATIVE: 0
ALLERGIC/IMMUNOLOGIC NEGATIVE: 0
EYES NEGATIVE: 0
CARDIOVASCULAR NEGATIVE: 0
NEUROLOGICAL NEGATIVE: 0
ENDOCRINE NEGATIVE: 0
PSYCHIATRIC NEGATIVE: 0
CONSTITUTIONAL NEGATIVE: 0
GASTROINTESTINAL NEGATIVE: 0

## 2024-05-16 ASSESSMENT — VISUAL ACUITY
CORRECTION_TYPE: GLASSES
METHOD: SNELLEN - LINEAR
OD_CC: 20/20
OS_CC+: -2
OS_CC: 20/30

## 2024-05-16 ASSESSMENT — CUP TO DISC RATIO
OS_RATIO: 0.5
OD_RATIO: 0.4

## 2024-05-16 ASSESSMENT — TONOMETRY
OD_IOP_MMHG: 15
IOP_METHOD: GOLDMANN APPLANATION
OS_IOP_MMHG: 14

## 2024-05-16 ASSESSMENT — PACHYMETRY
OD_CT(UM): 503
OS_CT(UM): 506

## 2024-05-16 ASSESSMENT — SLIT LAMP EXAM - LIDS
COMMENTS: NORMAL
COMMENTS: NORMAL

## 2024-05-16 ASSESSMENT — EXTERNAL EXAM - RIGHT EYE: OD_EXAM: NORMAL

## 2024-05-16 ASSESSMENT — EXTERNAL EXAM - LEFT EYE: OS_EXAM: NORMAL

## 2024-05-16 NOTE — PROGRESS NOTES
primary open angle glaucoma, both eyes mild stage   +family hx in father, aunts, no hx of blindness   aa race   thinner pachs   tmax 30/33 (new today, not on any drops)   inhaled/nasal steroids   gonio open   no trauma  Pedro Visual Field - OU - Both Eyes          Right Eye  Threshold was 24-2. Findings include normal observations.     Left Eye  Threshold was 24-2. Findings include normal observations.     Notes  Slightly trigger happy but no evidence of glaucoma OU          OCT, Optic Nerve - OU - Both Eyes          Right Eye  Images reviewed and comparison made to baseline.     Left Eye  Images reviewed and comparison made to baseline.     Notes  Concern for progression OU however exam limited by artifact         OCT, Retina - OU - Both Eyes          Right Eye  Findings include normal observations.     Left Eye  Findings include normal observations.     Notes  Retinal multimodal imaging including photography was completed, and the findings are described in the examination.           C/D asymmetry with OS>OD   long discussion with patient re-pathophysiology of glaucoma, potential blinding nature of disease   s/p SLT 1/22 OS   bblocker contraindicated   Good reduction with brim BID,  but developed allergy   IOP remains at goal <21   continue latan qhs OU   rtc 6 months for HVF 24-2 and IOP check       diabetes without retinopathy: recommend yearly exams created by:Tiesha Dolan

## 2024-05-23 ENCOUNTER — TREATMENT (OUTPATIENT)
Dept: PHYSICAL THERAPY | Facility: CLINIC | Age: 79
End: 2024-05-23
Payer: MEDICARE

## 2024-05-23 DIAGNOSIS — M25.551 BILATERAL HIP PAIN: ICD-10-CM

## 2024-05-23 DIAGNOSIS — M25.511 ACUTE PAIN OF RIGHT SHOULDER: ICD-10-CM

## 2024-05-23 DIAGNOSIS — M54.50 ACUTE MIDLINE LOW BACK PAIN WITHOUT SCIATICA: ICD-10-CM

## 2024-05-23 DIAGNOSIS — M25.552 BILATERAL HIP PAIN: ICD-10-CM

## 2024-05-23 PROCEDURE — 97110 THERAPEUTIC EXERCISES: CPT | Mod: GP | Performed by: PHYSICAL THERAPIST

## 2024-05-23 NOTE — PROGRESS NOTES
Physical Therapy    Physical Therapy Treatment    Patient Name: Katarina Hernandez  MRN: [id number]  Today's Date: 5/23/2024  Time Calculation  Start Time: 0745  Stop Time: 0825  Time Calculation (min): 40 minVisit:4/8  Insurance: Medicare A+ B  Authorization: 4/1/24-7/1/24  PT Therapeutic Procedures Time Entry  Therapeutic Exercise Time Entry: 40    Assessment:  Patient with decreasing shoulder pain and improving shoulder ROM.          Plan: Continue with POC     Current Problem  1. Acute midline low back pain without sciatica  Follow Up In Physical Therapy      2. Bilateral hip pain  Follow Up In Physical Therapy      3. Acute pain of right shoulder  Follow Up In Physical Therapy        General        Subjective    Shoulder pain 1-2/10 at worst last 2 days - worse with strenuous, repetitive ADL's such as mopping  Doing all normal ADL's wth right shoulder   Not feeling well last week - lots of congestion in chest  Take tyelonol for pain occasionally     Precautions: DM, HTN, asthma, hx of TIA - working on getting DM and HTN under control        Objective     Shoulder  elevation, HBB to T7, ER to 60    Treatments:  SciFit stepper 6 minutes  Step stretch 20X  Resisted shoulder retraction and extension with green theraband 20X   KTC with strap   HLR 20X each  Strap assisted hamstring stretching 10X  Ball rolls on table 20X each  Chest press, flies and triceps with 2# dumbbells 10X   Cane bilateral alternating ER/IR x 20    Pulleys x 5 minutes  (40 minutes)    OP EDUCATION: HEP     Goals:  Active       PT Problem       PT Goal 1       Start:  04/01/24    Expected End:  07/01/24       Shoulder pain 2/10 or less         PT Goal 2       Start:  04/01/24    Expected End:  07/01/24       Improve quickdash by 15%         PT Goal 3       Start:  04/01/24    Expected End:  07/01/24       Shoulder ROM elevation 140, er 60, HBB to L3         PT Goal 4       Start:  04/01/24    Expected End:  07/01/24       Shoulder strength  3+-5/5

## 2024-05-29 ENCOUNTER — APPOINTMENT (OUTPATIENT)
Dept: PHYSICAL THERAPY | Facility: CLINIC | Age: 79
End: 2024-05-29
Payer: MEDICARE

## 2024-06-05 ENCOUNTER — TELEPHONE (OUTPATIENT)
Dept: PULMONOLOGY | Facility: HOSPITAL | Age: 79
End: 2024-06-05
Payer: MEDICARE

## 2024-06-05 DIAGNOSIS — J44.9 CHRONIC OBSTRUCTIVE PULMONARY DISEASE, UNSPECIFIED COPD TYPE (MULTI): ICD-10-CM

## 2024-06-05 NOTE — TELEPHONE ENCOUNTER
Pt reached out via [employer] message needing a 90 day prescription for her Trelegy. Order placed and routed to Dr. Jose to sign. Pt was updated via [employer].

## 2024-06-06 DIAGNOSIS — Z79.4 TYPE 2 DIABETES MELLITUS WITHOUT COMPLICATION, WITH LONG-TERM CURRENT USE OF INSULIN (MULTI): ICD-10-CM

## 2024-06-06 DIAGNOSIS — E11.9 TYPE 2 DIABETES MELLITUS WITHOUT COMPLICATION, WITHOUT LONG-TERM CURRENT USE OF INSULIN (MULTI): ICD-10-CM

## 2024-06-06 DIAGNOSIS — E11.9 TYPE 2 DIABETES MELLITUS WITHOUT COMPLICATION, WITH LONG-TERM CURRENT USE OF INSULIN (MULTI): ICD-10-CM

## 2024-06-10 DIAGNOSIS — J45.909 ASTHMA, UNSPECIFIED ASTHMA SEVERITY, UNSPECIFIED WHETHER COMPLICATED, UNSPECIFIED WHETHER PERSISTENT (HHS-HCC): ICD-10-CM

## 2024-06-10 RX ORDER — ALBUTEROL SULFATE 90 UG/1
AEROSOL, METERED RESPIRATORY (INHALATION)
Qty: 18 G | Refills: 11 | Status: SHIPPED | OUTPATIENT
Start: 2024-06-10

## 2024-06-11 RX ORDER — FLUTICASONE FUROATE, UMECLIDINIUM BROMIDE AND VILANTEROL TRIFENATATE 200; 62.5; 25 UG/1; UG/1; UG/1
1 POWDER RESPIRATORY (INHALATION)
Qty: 180 EACH | Refills: 3 | Status: SHIPPED | OUTPATIENT
Start: 2024-06-11

## 2024-06-17 ENCOUNTER — APPOINTMENT (OUTPATIENT)
Dept: PODIATRY | Facility: CLINIC | Age: 79
End: 2024-06-17
Payer: MEDICARE

## 2024-06-17 DIAGNOSIS — E78.5 HYPERLIPIDEMIA, UNSPECIFIED HYPERLIPIDEMIA TYPE: ICD-10-CM

## 2024-06-17 DIAGNOSIS — M79.674 PAIN IN TOES OF BOTH FEET: ICD-10-CM

## 2024-06-17 DIAGNOSIS — M79.672 FOOT PAIN, BILATERAL: Primary | ICD-10-CM

## 2024-06-17 DIAGNOSIS — M19.079 ARTHRITIS OF FOOT: ICD-10-CM

## 2024-06-17 DIAGNOSIS — E11.9 DM TYPE 2 WITHOUT RETINOPATHY (MULTI): ICD-10-CM

## 2024-06-17 DIAGNOSIS — M79.671 FOOT PAIN, BILATERAL: Primary | ICD-10-CM

## 2024-06-17 DIAGNOSIS — M79.675 PAIN IN TOES OF BOTH FEET: ICD-10-CM

## 2024-06-17 PROCEDURE — 99213 OFFICE O/P EST LOW 20 MIN: CPT | Performed by: PODIATRIST

## 2024-06-17 NOTE — PROGRESS NOTES
History of Present Illness:   Patient states they are here for nails as patient states she is unable to safely trim on her own  Patient also states the cream she has cleared up her prior rash  Needs a refill  States she has NTB to feet, it does not wake her at night.     Past Medical History  Past Medical History:   Diagnosis Date    Combined forms of age-related cataract, left eye 07/22/2019    Combined form of age-related cataract, left eye    Combined forms of age-related cataract, right eye 08/30/2019    Combined form of age-related cataract, right eye    Lesion of ulnar nerve, unspecified upper limb     Ulnar neuropathy    Personal history of colonic polyps     History of colonic polyps    Transient cerebral ischemic attack, unspecified     TIA (transient ischemic attack)    Unspecified acute conjunctivitis, bilateral 01/27/2020    Acute conjunctivitis of both eyes    Unspecified cataract     Cataracts, both eyes    Viral conjunctivitis, unspecified 11/08/2019    Acute viral conjunctivitis of right eye       Medications and Allergies have been reviewed.    Review Of Systems:  GENERAL: No weight loss, malaise or fevers.  HEENT: Negative for frequent or significant headaches,   RESPIRATORY: Negative for cough, wheezing or shortness of breath.  CARDIOVASCULAR: Negative for chest pain, leg swelling or palpitations.    Examination of Both Lower Extremities:   Objective:   Vasc: DP and PT pulses are palpable bilateral.  CFT is less than 3 seconds bilateral.  Skin temperature is warm to cool proximal to distal bilateral.      Neuro: Vibratory, light touch and proprioception are intact bilateral.     Derm: Nails 1-5 bilateral are elongated.  Skin is supple with normal texture and turgor noted.  Webspaces are clean, dry and intact bilateral.      Ortho: Muscle strength is 5/5 for all pedal groups tested.  Ankle joint, subtalar joint, 1st MPJ and lesser MPJ ROM is full and without pain or crepitus.  Pain to midfoot ROM.  No edema, erythema ecchymosis     1. Tinea pedis of both feet  clotrimazole-betamethasone (Lotrisone) cream      2. Foot pain, bilateral        3. Neuropathy        4. Onychomycosis        5. Arthritis, midfoot          Patient exam and eval  Dm foot health discussed  Also has pain to left midfoot  Discussed trying tylenol twice daily  Apply voltaren prn  Ice twice daily  Discussed stiff shoes  Shoes that do not twist or bend  Fu in 4 mos if no noted improvement  A1C 8.6  Try and Keep sugars under 7        Melida Vega DPM  [phone]  [facility] [address]  Fax: [phone]

## 2024-06-19 RX ORDER — ATORVASTATIN CALCIUM 40 MG/1
40 TABLET, FILM COATED ORAL NIGHTLY
Qty: 90 TABLET | Refills: 0 | Status: SHIPPED | OUTPATIENT
Start: 2024-06-19

## 2024-06-21 DIAGNOSIS — Z79.4 TYPE 2 DIABETES MELLITUS WITHOUT COMPLICATION, WITH LONG-TERM CURRENT USE OF INSULIN (MULTI): ICD-10-CM

## 2024-06-21 DIAGNOSIS — Z79.4 TYPE 2 DIABETES MELLITUS WITHOUT COMPLICATION, WITH LONG-TERM CURRENT USE OF INSULIN (MULTI): Primary | ICD-10-CM

## 2024-06-21 DIAGNOSIS — E11.9 TYPE 2 DIABETES MELLITUS WITHOUT COMPLICATION, WITH LONG-TERM CURRENT USE OF INSULIN (MULTI): Primary | ICD-10-CM

## 2024-06-21 DIAGNOSIS — E11.9 TYPE 2 DIABETES MELLITUS WITHOUT COMPLICATION, WITH LONG-TERM CURRENT USE OF INSULIN (MULTI): ICD-10-CM

## 2024-06-21 RX ORDER — PEN NEEDLE, DIABETIC 29 G X1/2"
1 NEEDLE, DISPOSABLE MISCELLANEOUS 3 TIMES DAILY
Qty: 270 EACH | Refills: 0 | Status: SHIPPED | OUTPATIENT
Start: 2024-06-21 | End: 2024-09-19

## 2024-06-21 RX ORDER — PEN NEEDLE, DIABETIC 29 G X1/2"
1 NEEDLE, DISPOSABLE MISCELLANEOUS 3 TIMES DAILY
Qty: 270 EACH | Refills: 0 | Status: SHIPPED | OUTPATIENT
Start: 2024-06-21 | End: 2024-06-21 | Stop reason: SDUPTHER

## 2024-07-01 ENCOUNTER — APPOINTMENT (OUTPATIENT)
Dept: PHYSICAL THERAPY | Facility: CLINIC | Age: 79
End: 2024-07-01
Payer: MEDICARE

## 2024-07-11 ENCOUNTER — TREATMENT (OUTPATIENT)
Dept: PHYSICAL THERAPY | Facility: CLINIC | Age: 79
End: 2024-07-11
Payer: MEDICARE

## 2024-07-11 DIAGNOSIS — M25.551 BILATERAL HIP PAIN: ICD-10-CM

## 2024-07-11 DIAGNOSIS — M25.552 BILATERAL HIP PAIN: ICD-10-CM

## 2024-07-11 DIAGNOSIS — M54.50 ACUTE MIDLINE LOW BACK PAIN WITHOUT SCIATICA: Primary | ICD-10-CM

## 2024-07-11 DIAGNOSIS — M25.511 ACUTE PAIN OF RIGHT SHOULDER: ICD-10-CM

## 2024-07-11 PROCEDURE — 97110 THERAPEUTIC EXERCISES: CPT | Mod: GP,CQ

## 2024-07-11 ASSESSMENT — PAIN - FUNCTIONAL ASSESSMENT: PAIN_FUNCTIONAL_ASSESSMENT: 0-10

## 2024-07-11 ASSESSMENT — PAIN SCALES - GENERAL: PAINLEVEL_OUTOF10: 7

## 2024-07-11 NOTE — PROGRESS NOTES
Physical Therapy    Physical Therapy Treatment    Patient Name: Katarina Hernandez  MRN: [id number]  Today's Date: 7/11/2024  Time Calculation  Start Time: 0800  Stop Time: 0845  Time Calculation (min): 45 min  Visit: 5/8  Insurance: Medicare A+ B  Authorization: 4/1/24-7/1/24  PT Therapeutic Procedures Time Entry  Therapeutic Exercise Time Entry: 45    Assessment:  PT Assessment  Evaluation/Treatment Tolerance: Patient tolerated treatment well  Rest break after stepper. Core strengthening added today in combination with glute strengthening. Bridges were slightly challenging to the patient. No reports of pain exacerbation through today's given treatment. Finishes session report minimal to no pain in the right low back/glute areas. Shoulder continued to have no pain.     Plan: Continue with POC     Current Problem  1. Acute midline low back pain without sciatica        2. Bilateral hip pain        3. Acute pain of right shoulder        General        Subjective    No pain through shoulder at this time  Right low back pain and right hip pain at a 7/10 currently  Intermittent compliance of HEP  No other major updates at the moment    Precautions: DM, HTN, asthma, hx of TIA - working on getting DM and HTN under control        Objective   Shoulder  elevation, HBB to T7, ER to 60    Treatments: x 45 min  SciFit stepper 5 minutes  Step stretch 20X  Hooklying hip adduction with TA holds 15 x 5 sec  Hooklying hip abduction with TA contractions x 15  Hooklying hip flexion x 15 each  Strap assisted hamstring stretching 3 x 20 sec each  HLR 20X each  Bridge x 15  Ball rolls on table 20X each  Resisted shoulder retraction and extension with green theraband 20X    NT  Chest press, flies and triceps with 2# dumbbells 10X   Cane bilateral alternating ER/IR x 20    OP EDUCATION: HEP     Goals:  Active       PT Problem       PT Goal 1       Start:  04/01/24    Expected End:  07/01/24       Shoulder pain 2/10 or less         PT Goal  2       Start:  04/01/24    Expected End:  07/01/24       Improve quickdash by 15%         PT Goal 3       Start:  04/01/24    Expected End:  07/01/24       Shoulder ROM elevation 140, er 60, HBB to L3         PT Goal 4       Start:  04/01/24    Expected End:  07/01/24       Shoulder strength 3+-5/5

## 2024-07-17 DIAGNOSIS — E78.5 HYPERLIPIDEMIA, UNSPECIFIED HYPERLIPIDEMIA TYPE: ICD-10-CM

## 2024-07-18 ENCOUNTER — TREATMENT (OUTPATIENT)
Dept: PHYSICAL THERAPY | Facility: CLINIC | Age: 79
End: 2024-07-18
Payer: MEDICARE

## 2024-07-18 DIAGNOSIS — M54.50 ACUTE MIDLINE LOW BACK PAIN WITHOUT SCIATICA: Primary | ICD-10-CM

## 2024-07-18 DIAGNOSIS — E11.9 TYPE 2 DIABETES MELLITUS WITHOUT COMPLICATION, WITHOUT LONG-TERM CURRENT USE OF INSULIN (MULTI): Primary | ICD-10-CM

## 2024-07-18 PROCEDURE — 97110 THERAPEUTIC EXERCISES: CPT | Mod: GP | Performed by: PHYSICAL THERAPIST

## 2024-07-18 RX ORDER — ATORVASTATIN CALCIUM 40 MG/1
40 TABLET, FILM COATED ORAL NIGHTLY
Qty: 90 TABLET | Refills: 0 | Status: SHIPPED | OUTPATIENT
Start: 2024-07-18

## 2024-07-18 NOTE — PROGRESS NOTES
Physical Therapy    Physical Therapy Treatment    Patient Name: Katarina Hernandez  MRN: [id number]  Today's Date: 7/18/2024  Time Calculation  Start Time: 0230  Stop Time: 0310  Time Calculation (min): 40 min  Visit: 6/8  Insurance: Medicare A+ B  Authorization: 4/1/24-7/1/24  PT Therapeutic Procedures Time Entry  Therapeutic Exercise Time Entry: 40    Assessment:  Patient seen in PT for 6 visits for shoulder and back pain.  Patient's shoulder pain has resolved.  Patient has continued back and leg pain which is to 8/10 after sit to stand after prolonged immobility.  Patient encouraged to continue with HEP.  Patient to follow up with PCP due to continued high pain levels.           Plan: Continue with POC per doctors recommendation     Current Problem  1. Acute midline low back pain without sciatica          General        Subjective    No pain through shoulder at this time  Right low back pain and right hip pain 8/10 - pain after sit to stand - works out as you move  Limited to 15-20 minutes walking due to pain        Precautions: DM, HTN, asthma, hx of TIA - working on getting DM and HTN under control        Objective       Treatments: x 40 min  SciFit stepper 7 minutes  Step stretch 20X  Practiced heel to toe walking 20' X 4  HLR 20X   Theraball KTC with strap 20X   Theraball hip abd/add 20X  Theraball HLR, KTC and bridges 20X each  Modified piriformis and gluteal stretch 10X   SLR with strap 10X   Bridge x 15  (40 minutes)    NT  Chest press, flies and triceps with 2# dumbbells 10X   Cane bilateral alternating ER/IR x 20    OP EDUCATION: HEP     Goals:  Active       PT Problem       PT Goal 1       Start:  04/01/24    Expected End:  07/01/24       Shoulder pain 2/10 or less         PT Goal 2       Start:  04/01/24    Expected End:  07/01/24       Improve quickdash by 15%         PT Goal 3       Start:  04/01/24    Expected End:  07/01/24       Shoulder ROM elevation 140, er 60, HBB to L3         PT Goal 4        Start:  04/01/24    Expected End:  07/01/24       Shoulder strength 3+-5/5

## 2024-07-23 DIAGNOSIS — Z79.4 TYPE 2 DIABETES MELLITUS WITHOUT COMPLICATION, WITH LONG-TERM CURRENT USE OF INSULIN (MULTI): ICD-10-CM

## 2024-07-23 DIAGNOSIS — E11.9 TYPE 2 DIABETES MELLITUS WITHOUT COMPLICATION, WITH LONG-TERM CURRENT USE OF INSULIN (MULTI): ICD-10-CM

## 2024-07-25 RX ORDER — PEN NEEDLE, DIABETIC 29 G X1/2"
NEEDLE, DISPOSABLE MISCELLANEOUS
Qty: 100 EACH | Refills: 0 | Status: SHIPPED | OUTPATIENT
Start: 2024-07-25

## 2024-07-26 ENCOUNTER — LAB (OUTPATIENT)
Dept: LAB | Facility: LAB | Age: 79
End: 2024-07-26
Payer: MEDICARE

## 2024-07-26 DIAGNOSIS — E11.9 TYPE 2 DIABETES MELLITUS WITHOUT COMPLICATION, WITHOUT LONG-TERM CURRENT USE OF INSULIN (MULTI): ICD-10-CM

## 2024-07-26 LAB
ALBUMIN SERPL BCP-MCNC: 3.8 G/DL (ref 3.4–5)
ALP SERPL-CCNC: 84 U/L (ref 33–136)
ALT SERPL W P-5'-P-CCNC: 15 U/L (ref 7–45)
ANION GAP SERPL CALC-SCNC: 10 MMOL/L (ref 10–20)
AST SERPL W P-5'-P-CCNC: 14 U/L (ref 9–39)
BILIRUB SERPL-MCNC: 0.4 MG/DL (ref 0–1.2)
BUN SERPL-MCNC: 18 MG/DL (ref 6–23)
CALCIUM SERPL-MCNC: 9.7 MG/DL (ref 8.6–10.6)
CHLORIDE SERPL-SCNC: 103 MMOL/L (ref 98–107)
CHOLEST SERPL-MCNC: 131 MG/DL (ref 0–199)
CHOLESTEROL/HDL RATIO: 2.1
CO2 SERPL-SCNC: 35 MMOL/L (ref 21–32)
CREAT SERPL-MCNC: 0.91 MG/DL (ref 0.5–1.05)
CREAT UR-MCNC: 93.9 MG/DL (ref 20–320)
EGFRCR SERPLBLD CKD-EPI 2021: 64 ML/MIN/1.73M*2
ERYTHROCYTE [DISTWIDTH] IN BLOOD BY AUTOMATED COUNT: 14.9 % (ref 11.5–14.5)
EST. AVERAGE GLUCOSE BLD GHB EST-MCNC: 183 MG/DL
GLUCOSE SERPL-MCNC: 117 MG/DL (ref 74–99)
HBA1C MFR BLD: 8 %
HCT VFR BLD AUTO: 39.3 % (ref 36–46)
HDLC SERPL-MCNC: 62 MG/DL
HGB BLD-MCNC: 12.1 G/DL (ref 12–16)
LDLC SERPL CALC-MCNC: 50 MG/DL
MCH RBC QN AUTO: 27.3 PG (ref 26–34)
MCHC RBC AUTO-ENTMCNC: 30.8 G/DL (ref 32–36)
MCV RBC AUTO: 89 FL (ref 80–100)
MICROALBUMIN UR-MCNC: 10.3 MG/L
MICROALBUMIN/CREAT UR: 11 UG/MG CREAT
NON HDL CHOLESTEROL: 69 MG/DL (ref 0–149)
NRBC BLD-RTO: 0 /100 WBCS (ref 0–0)
PLATELET # BLD AUTO: 166 X10*3/UL (ref 150–450)
POTASSIUM SERPL-SCNC: 4.3 MMOL/L (ref 3.5–5.3)
PROT SERPL-MCNC: 6.6 G/DL (ref 6.4–8.2)
RBC # BLD AUTO: 4.44 X10*6/UL (ref 4–5.2)
SODIUM SERPL-SCNC: 144 MMOL/L (ref 136–145)
TRIGL SERPL-MCNC: 94 MG/DL (ref 0–149)
TSH SERPL-ACNC: 1.72 MIU/L (ref 0.44–3.98)
VLDL: 19 MG/DL (ref 0–40)
WBC # BLD AUTO: 10.2 X10*3/UL (ref 4.4–11.3)

## 2024-07-26 PROCEDURE — 82570 ASSAY OF URINE CREATININE: CPT

## 2024-07-26 PROCEDURE — 82043 UR ALBUMIN QUANTITATIVE: CPT

## 2024-07-26 PROCEDURE — 80053 COMPREHEN METABOLIC PANEL: CPT

## 2024-07-26 PROCEDURE — 80061 LIPID PANEL: CPT

## 2024-07-26 PROCEDURE — 85027 COMPLETE CBC AUTOMATED: CPT

## 2024-07-26 PROCEDURE — 84443 ASSAY THYROID STIM HORMONE: CPT

## 2024-07-26 PROCEDURE — 36415 COLL VENOUS BLD VENIPUNCTURE: CPT

## 2024-07-26 PROCEDURE — 83036 HEMOGLOBIN GLYCOSYLATED A1C: CPT

## 2024-07-30 ENCOUNTER — APPOINTMENT (OUTPATIENT)
Dept: PRIMARY CARE | Facility: CLINIC | Age: 79
End: 2024-07-30
Payer: MEDICARE

## 2024-08-02 ENCOUNTER — TELEPHONE (OUTPATIENT)
Dept: PRIMARY CARE | Facility: CLINIC | Age: 79
End: 2024-08-02

## 2024-08-02 DIAGNOSIS — J30.9 ALLERGIC RHINITIS, UNSPECIFIED SEASONALITY, UNSPECIFIED TRIGGER: ICD-10-CM

## 2024-08-02 RX ORDER — MONTELUKAST SODIUM 10 MG/1
10 TABLET ORAL DAILY
Qty: 90 TABLET | Refills: 0 | OUTPATIENT
Start: 2024-08-02

## 2024-08-05 ENCOUNTER — LAB (OUTPATIENT)
Dept: LAB | Facility: LAB | Age: 79
End: 2024-08-05
Payer: MEDICARE

## 2024-08-05 ENCOUNTER — APPOINTMENT (OUTPATIENT)
Dept: PRIMARY CARE | Facility: CLINIC | Age: 79
End: 2024-08-05
Payer: MEDICARE

## 2024-08-05 VITALS
OXYGEN SATURATION: 93 % | BODY MASS INDEX: 42.09 KG/M2 | WEIGHT: 222.9 LBS | DIASTOLIC BLOOD PRESSURE: 66 MMHG | HEIGHT: 61 IN | SYSTOLIC BLOOD PRESSURE: 116 MMHG | HEART RATE: 77 BPM

## 2024-08-05 DIAGNOSIS — I11.0 HYPERTENSIVE HEART DISEASE WITH HEART FAILURE (MULTI): ICD-10-CM

## 2024-08-05 DIAGNOSIS — Z79.4 TYPE 2 DIABETES MELLITUS WITHOUT COMPLICATION, WITH LONG-TERM CURRENT USE OF INSULIN (MULTI): Primary | ICD-10-CM

## 2024-08-05 DIAGNOSIS — M79.89 LEG SWELLING: ICD-10-CM

## 2024-08-05 DIAGNOSIS — I50.31 ACUTE DIASTOLIC (CONGESTIVE) HEART FAILURE (MULTI): ICD-10-CM

## 2024-08-05 DIAGNOSIS — E11.9 TYPE 2 DIABETES MELLITUS WITHOUT COMPLICATION, WITH LONG-TERM CURRENT USE OF INSULIN (MULTI): Primary | ICD-10-CM

## 2024-08-05 DIAGNOSIS — J30.9 ALLERGIC RHINITIS, UNSPECIFIED SEASONALITY, UNSPECIFIED TRIGGER: ICD-10-CM

## 2024-08-05 LAB — BNP SERPL-MCNC: 24 PG/ML (ref 0–99)

## 2024-08-05 PROCEDURE — 3074F SYST BP LT 130 MM HG: CPT | Performed by: STUDENT IN AN ORGANIZED HEALTH CARE EDUCATION/TRAINING PROGRAM

## 2024-08-05 PROCEDURE — 99214 OFFICE O/P EST MOD 30 MIN: CPT | Performed by: STUDENT IN AN ORGANIZED HEALTH CARE EDUCATION/TRAINING PROGRAM

## 2024-08-05 PROCEDURE — 3078F DIAST BP <80 MM HG: CPT | Performed by: STUDENT IN AN ORGANIZED HEALTH CARE EDUCATION/TRAINING PROGRAM

## 2024-08-05 PROCEDURE — 81001 URINALYSIS AUTO W/SCOPE: CPT

## 2024-08-05 PROCEDURE — 36415 COLL VENOUS BLD VENIPUNCTURE: CPT

## 2024-08-05 PROCEDURE — 83880 ASSAY OF NATRIURETIC PEPTIDE: CPT

## 2024-08-05 RX ORDER — TIRZEPATIDE 2.5 MG/.5ML
2.5 INJECTION, SOLUTION SUBCUTANEOUS
Qty: 4 PEN | Refills: 1 | Status: SHIPPED | OUTPATIENT
Start: 2024-08-11 | End: 2024-10-06

## 2024-08-05 RX ORDER — MONTELUKAST SODIUM 10 MG/1
10 TABLET ORAL DAILY
Qty: 90 TABLET | Refills: 0 | Status: SHIPPED | OUTPATIENT
Start: 2024-08-05

## 2024-08-05 ASSESSMENT — PATIENT HEALTH QUESTIONNAIRE - PHQ9
1. LITTLE INTEREST OR PLEASURE IN DOING THINGS: NOT AT ALL
SUM OF ALL RESPONSES TO PHQ9 QUESTIONS 1 AND 2: 0
2. FEELING DOWN, DEPRESSED OR HOPELESS: NOT AT ALL

## 2024-08-05 NOTE — PROGRESS NOTES
Subjective   Patient ID: Katarina Hernandez is a 78 y.o. female who presents for No chief complaint on file..  HPI  Patient is 78 years old, patient of Dr. Muñoz is here for her follow-up     #1 hypertension on carvedilol 12.5 twice daily and lisinopril 5 mg  2.  HPF-as above and torsemide  3.  COPD/interstitial lung disease on continuous oxygen, Advair and Incruse Ellipta.  4.  GERD/ulcerative colitis-on pantoprazole-chronic use balsalazide following up with gastroenterology  5.  Hyperlipidemia on atorvastatin 40  6.  On Plavix for coronary artery disease  7.  Obstructive sleep apnea on PAP  8.  Diabetes mellitus  Januvia 50, Jardiance 10  novolin 42/47 BID -> reduced to 32/32, fewer hypoglycemic episodes  S/e with metformin   - Freestyle           Has the following concerns  1.  Discoloration of the neck.  Reports ongoing for several years  Significant for acanthosis nigricans    2.  Bilateral lower limb swelling-chronic.  No new onset shortness of breath or paroxysmal nocturnal dyspnea or orthopnea reported  -No rise in JVD observed.  Lung clear to auscultation with no rhonchi.  Possibly dependent edema.  Will recheck echocardiogram.  3.  Weight concerns/diabetes    Stop januvia   Continue insulin  Continue jardiance   Start mounjaro 2.5mg.  Discussed medullary thyroid cancer and other side effects.  Agreeable verbalizes understanding    Past Medical History:   Diagnosis Date    Combined forms of age-related cataract, left eye 07/22/2019    Combined form of age-related cataract, left eye    Combined forms of age-related cataract, right eye 08/30/2019    Combined form of age-related cataract, right eye    Lesion of ulnar nerve, unspecified upper limb     Ulnar neuropathy    Personal history of colonic polyps     History of colonic polyps    Transient cerebral ischemic attack, unspecified     TIA (transient ischemic attack)    Unspecified acute conjunctivitis, bilateral 01/27/2020    Acute conjunctivitis of both eyes     Unspecified cataract     Cataracts, both eyes    Viral conjunctivitis, unspecified 11/08/2019    Acute viral conjunctivitis of right eye      Past Surgical History:   Procedure Laterality Date    MR HEAD ANGIO WO IV CONTRAST  1/19/2019    MR HEAD ANGIO WO IV CONTRAST 1/19/2019 [facility]    MR NECK ANGIO WO IV CONTRAST  1/19/2019    MR NECK ANGIO WO IV CONTRAST 1/19/2019 [facility]    OTHER SURGICAL HISTORY  01/28/2019    Appendectomy    OTHER SURGICAL HISTORY  01/14/2020    Colonoscopy    OTHER SURGICAL HISTORY  01/14/2020    Esophagogastroduodenoscopy    OTHER SURGICAL HISTORY  09/09/2019    Cataract surgery      Family History   Problem Relation Name Age of Onset    Other (CVA) Mother      Coronary artery disease Mother      Hypertension Mother      Glaucoma Father      Hypertension Father      Multiple myeloma Sister      Diabetes Brother      Hypertension Brother      Diabetes Daughter      Macular degeneration Mother's Sister      Diabetes Father's Sister      Diabetes Father's Brother        Allergies   Allergen Reactions    Ciprofloxacin GI Upset and Nausea Only    Clindamycin Nausea And Vomiting    Penicillins Rash and Hives    Fluocinolone Unknown    Latex Hives    Quinolones Nausea And Vomiting and Nausea/vomiting    Streptomycin Unknown    Cimetidine Nausea And Vomiting    Erythromycin Nausea Only and Rash    Sulfa (Sulfonamide Antibiotics) Nausea And Vomiting and Unknown          Occupation:     Review of Systems   Constitutional:  Negative for activity change and fever.   HENT:  Negative for congestion.    Respiratory:  Negative for cough, shortness of breath and wheezing.    Cardiovascular:  Positive for leg swelling. Negative for chest pain.   Gastrointestinal:  Negative for abdominal pain, constipation, nausea and vomiting.   Endocrine: Negative for cold intolerance.   Genitourinary:  Negative for dysuria, hematuria and urgency.   Neurological:  Negative for dizziness, speech  difficulty, weakness and numbness.   Psychiatric/Behavioral:  Negative for self-injury and suicidal ideas.        Objective   Visit Vitals  OB Status Postmenopausal   Smoking Status Never Assessed      Physical Exam  Constitutional:       Appearance: Normal appearance.   HENT:      Head: Normocephalic and atraumatic.      Nose: Nose normal.      Mouth/Throat:      Mouth: Mucous membranes are moist.   Eyes:      Conjunctiva/sclera: Conjunctivae normal.      Pupils: Pupils are equal, round, and reactive to light.   Cardiovascular:      Rate and Rhythm: Normal rate and regular rhythm.      Pulses: Normal pulses.      Heart sounds: Normal heart sounds.   Pulmonary:      Effort: Pulmonary effort is normal.      Breath sounds: Normal breath sounds.   Musculoskeletal:         General: Normal range of motion.      Cervical back: Neck supple.      Right lower leg: Edema (1+) present.      Left lower leg: Edema present.      Comments: Chronic skin changes   Skin:     General: Skin is warm.   Neurological:      General: No focal deficit present.      Mental Status: She is alert and oriented to person, place, and time.   Psychiatric:         Mood and Affect: Mood normal.         Behavior: Behavior normal.         Thought Content: Thought content normal.         Judgment: Judgment normal.         Assessment/Plan   Diagnoses and all orders for this visit:  Type 2 diabetes mellitus without complication, with long-term current use of insulin (Multi)  -     tirzepatide (Mounjaro) 2.5 mg/0.5 mL pen injector; Inject 2.5 mg under the skin 1 (one) time per week. (Patient taking differently: Inject 2.5 mg under the skin 1 (one) time per week. Haven't started)  Allergic rhinitis, unspecified seasonality, unspecified trigger  -     montelukast (Singulair) 10 mg tablet; Take 1 tablet (10 mg) by mouth once daily.  Leg swelling  -     Urinalysis with Reflex Microscopic; Future  -     B-type natriuretic peptide; Future  -     Transthoracic  Echo Complete; Future  Acute diastolic (congestive) heart failure (Multi)  -     B-type natriuretic peptide; Future  Hypertensive heart disease with heart failure (Multi)  -     Transthoracic Echo Complete; Future

## 2024-08-05 NOTE — PATIENT INSTRUCTIONS
It was nice seeing you today   Here is a a summary of what we discussed -  Your Blood pressure is at goal today. Please continue your current medications. Please take a low salt diet and exercise atleast for 150min/week  We have ordered some labs for you. Please proceed to the our lab at [address] to give these labs.   3. Other imaging/procedure orders I have put in for you are   Echocardiogram      Please call the scheduling line below to set up the appointment  4. For DM; I have put in Mounjaro for you. Once you get the medications, stop using Januvia. Please continue to use your insulin, Jardiance.    Once all the results are obtained we will call you with abnormal results if any and discuss necessity medication/lifestyle changes/further evaluation.  Please feel free to call our office at [phone] with any questions  Please do not hesitate to call us for medication refills.  In case of emergency please proceed to the nearest emergency room or call 911.    Please follow-up with me in 3 months.  You can set your appointment by stopping by at the  or calling our office at [phone] or logging onto [employer] account.

## 2024-08-06 LAB
APPEARANCE UR: CLEAR
BILIRUB UR STRIP.AUTO-MCNC: NEGATIVE MG/DL
COLOR UR: COLORLESS
GLUCOSE UR STRIP.AUTO-MCNC: ABNORMAL MG/DL
KETONES UR STRIP.AUTO-MCNC: NEGATIVE MG/DL
LEUKOCYTE ESTERASE UR QL STRIP.AUTO: ABNORMAL
NITRITE UR QL STRIP.AUTO: NEGATIVE
PH UR STRIP.AUTO: 6 [PH]
PROT UR STRIP.AUTO-MCNC: NEGATIVE MG/DL
RBC # UR STRIP.AUTO: NEGATIVE /UL
RBC #/AREA URNS AUTO: NORMAL /HPF
SP GR UR STRIP.AUTO: 1.01
UROBILINOGEN UR STRIP.AUTO-MCNC: NORMAL MG/DL
WBC #/AREA URNS AUTO: NORMAL /HPF

## 2024-08-08 ENCOUNTER — ANCILLARY PROCEDURE (OUTPATIENT)
Dept: CARDIOLOGY | Facility: CLINIC | Age: 79
End: 2024-08-08
Payer: MEDICARE

## 2024-08-08 VITALS — BODY MASS INDEX: 40.85 KG/M2 | HEIGHT: 62 IN | WEIGHT: 222 LBS

## 2024-08-08 DIAGNOSIS — R00.2 PALPITATIONS: Primary | ICD-10-CM

## 2024-08-08 DIAGNOSIS — I11.0 HYPERTENSIVE HEART DISEASE WITH HEART FAILURE (MULTI): ICD-10-CM

## 2024-08-08 DIAGNOSIS — M79.89 LEG SWELLING: ICD-10-CM

## 2024-08-08 DIAGNOSIS — R60.0 LOCALIZED EDEMA: ICD-10-CM

## 2024-08-08 LAB
AORTIC VALVE PEAK VELOCITY: 1.7 M/S
AV PEAK GRADIENT: 11.5 MMHG
AVA (PEAK VEL): 2.54 CM2
EJECTION FRACTION APICAL 4 CHAMBER: 65.8
EJECTION FRACTION: 66 %
LEFT ATRIUM VOLUME AREA LENGTH INDEX BSA: 15.2 ML/M2
LEFT VENTRICLE INTERNAL DIMENSION DIASTOLE: 4.47 CM (ref 3.5–6)
LEFT VENTRICULAR OUTFLOW TRACT DIAMETER: 2.04 CM
MITRAL VALVE E/A RATIO: 0.9
RIGHT VENTRICLE FREE WALL PEAK S': 12 CM/S

## 2024-08-08 PROCEDURE — 93306 TTE W/DOPPLER COMPLETE: CPT

## 2024-08-08 PROCEDURE — 93306 TTE W/DOPPLER COMPLETE: CPT | Performed by: STUDENT IN AN ORGANIZED HEALTH CARE EDUCATION/TRAINING PROGRAM

## 2024-08-09 ENCOUNTER — OFFICE VISIT (OUTPATIENT)
Dept: GASTROENTEROLOGY | Facility: CLINIC | Age: 79
End: 2024-08-09
Payer: MEDICARE

## 2024-08-09 ENCOUNTER — HOSPITAL ENCOUNTER (OUTPATIENT)
Dept: RADIOLOGY | Facility: CLINIC | Age: 79
Discharge: HOME | End: 2024-08-09
Payer: MEDICARE

## 2024-08-09 VITALS
SYSTOLIC BLOOD PRESSURE: 125 MMHG | HEART RATE: 72 BPM | HEIGHT: 61 IN | TEMPERATURE: 98.1 F | DIASTOLIC BLOOD PRESSURE: 69 MMHG | WEIGHT: 220 LBS | BODY MASS INDEX: 41.54 KG/M2

## 2024-08-09 DIAGNOSIS — K51.00 ULCERATIVE PANCOLITIS WITHOUT COMPLICATION (MULTI): ICD-10-CM

## 2024-08-09 DIAGNOSIS — K64.9 HEMORRHOIDS, UNSPECIFIED HEMORRHOID TYPE: Primary | ICD-10-CM

## 2024-08-09 DIAGNOSIS — K59.00 CONSTIPATION, UNSPECIFIED CONSTIPATION TYPE: ICD-10-CM

## 2024-08-09 PROCEDURE — 1159F MED LIST DOCD IN RCRD: CPT | Performed by: NURSE PRACTITIONER

## 2024-08-09 PROCEDURE — 99214 OFFICE O/P EST MOD 30 MIN: CPT | Performed by: NURSE PRACTITIONER

## 2024-08-09 PROCEDURE — 74018 RADEX ABDOMEN 1 VIEW: CPT

## 2024-08-09 PROCEDURE — 1036F TOBACCO NON-USER: CPT | Performed by: NURSE PRACTITIONER

## 2024-08-09 PROCEDURE — 3078F DIAST BP <80 MM HG: CPT | Performed by: NURSE PRACTITIONER

## 2024-08-09 PROCEDURE — 1160F RVW MEDS BY RX/DR IN RCRD: CPT | Performed by: NURSE PRACTITIONER

## 2024-08-09 PROCEDURE — 3074F SYST BP LT 130 MM HG: CPT | Performed by: NURSE PRACTITIONER

## 2024-08-09 RX ORDER — HYDROCORTISONE 25 MG/G
CREAM TOPICAL 2 TIMES DAILY
Qty: 28 G | Refills: 2 | Status: SHIPPED | OUTPATIENT
Start: 2024-08-09

## 2024-08-09 NOTE — PATIENT INSTRUCTIONS
Ulcerative flare- please continue the balsalazide.  I would recommend getting a stool fecal calprotectin to check for inflammation.     Hemmorhoids-I will order hydrocortisone cream to help shrink the hemorrhoid.    Bilateral upper quadrant- I will order an abd x-ray to assess for stool burden in the colon    I will call you with the results and determine follow up

## 2024-08-09 NOTE — PROGRESS NOTES
Subjective   Patient ID: Katarina Hernandez is a 78 y.o. female.    HPI  78-year-old female for follow-up visit for ulcerative colitis  Previously seen 8/24/2021  She is currently on balsalazide  Medical history includes UC, diabetes type 2, hypertension, GERD, HPL, AMY on CPAP, CAD on Plavix  11/14/2022 colonoscopy showed normal ileum, 2 polyps, ulcerative colitis Todd score 1 from right colon to rectum unchanged from previous exam  hyperplastic polyp  Chronic active colitis  Labs reviewed 7/26/2024  H&H 12.1 and 39.3  TSH 1.72  Normal LFTs  Hemoglobin A1c 8.0%  Stomach has been bothering her  Feels bloated  Feels rubbly   Hemorrhoids  Has had blood in her stool- brighter red blood on TP  When she wipes has blood on the toilet paper  No diarrhea or hard stools  Using metamucil daily  Using collagan tsp 3 times per day for a year        Objective   Physical Exam  Cardiovascular:      Rate and Rhythm: Normal rate and regular rhythm.      Pulses: Normal pulses.      Heart sounds: Normal heart sounds.   Pulmonary:      Effort: Pulmonary effort is normal.      Breath sounds: Normal breath sounds.   Abdominal:      General: Bowel sounds are normal.      Palpations: Abdomen is soft.     Hemorrhoids- External non-thrombosed    Assessment/Plan     Ulcerative flare- please continue the balsalazide.  I would recommend getting a stool fecal calprotectin to check for inflammation.     Hemmorhoids-I will order hydrocortisone cream to help shrink the hemorrhoid.    Bilateral upper quadrant- I will order an abd x-ray to assess for stool burden in the colon    I will call you with the results and determine follow up

## 2024-08-13 ENCOUNTER — LAB (OUTPATIENT)
Dept: LAB | Facility: LAB | Age: 79
End: 2024-08-13
Payer: MEDICARE

## 2024-08-13 DIAGNOSIS — K51.00 ULCERATIVE PANCOLITIS WITHOUT COMPLICATION (MULTI): ICD-10-CM

## 2024-08-13 PROCEDURE — 83993 ASSAY FOR CALPROTECTIN FECAL: CPT

## 2024-08-15 ASSESSMENT — ENCOUNTER SYMPTOMS
DYSURIA: 0
NAUSEA: 0
SHORTNESS OF BREATH: 0
WHEEZING: 0
HEMATURIA: 0
CONSTIPATION: 0
SPEECH DIFFICULTY: 0
VOMITING: 0
COUGH: 0
ABDOMINAL PAIN: 0
FEVER: 0
WEAKNESS: 0
NUMBNESS: 0
DIZZINESS: 0
ACTIVITY CHANGE: 0

## 2024-08-16 LAB — CALPROTECTIN STL-MCNT: 218 UG/G

## 2024-08-19 DIAGNOSIS — K51.30 ULCERATIVE RECTOSIGMOIDITIS WITHOUT COMPLICATION (MULTI): Primary | ICD-10-CM

## 2024-08-19 RX ORDER — PREDNISONE 5 MG/1
TABLET ORAL
Qty: 70 TABLET | Refills: 0 | Status: SHIPPED | OUTPATIENT
Start: 2024-08-19 | End: 2024-09-16

## 2024-08-21 ENCOUNTER — OFFICE VISIT (OUTPATIENT)
Dept: PULMONOLOGY | Facility: CLINIC | Age: 79
End: 2024-08-21
Payer: MEDICARE

## 2024-08-21 VITALS
SYSTOLIC BLOOD PRESSURE: 108 MMHG | TEMPERATURE: 97.3 F | OXYGEN SATURATION: 97 % | WEIGHT: 220 LBS | DIASTOLIC BLOOD PRESSURE: 61 MMHG | RESPIRATION RATE: 20 BRPM | HEART RATE: 72 BPM | BODY MASS INDEX: 41.57 KG/M2

## 2024-08-21 DIAGNOSIS — J44.89 ASTHMA-COPD OVERLAP SYNDROME (MULTI): ICD-10-CM

## 2024-08-21 DIAGNOSIS — J84.9 INTERSTITIAL LUNG DISEASE (MULTI): ICD-10-CM

## 2024-08-21 DIAGNOSIS — G47.33 OBSTRUCTIVE SLEEP APNEA (ADULT) (PEDIATRIC): ICD-10-CM

## 2024-08-21 PROCEDURE — 99213 OFFICE O/P EST LOW 20 MIN: CPT | Performed by: INTERNAL MEDICINE

## 2024-08-21 PROCEDURE — 3074F SYST BP LT 130 MM HG: CPT | Performed by: INTERNAL MEDICINE

## 2024-08-21 PROCEDURE — 1159F MED LIST DOCD IN RCRD: CPT | Performed by: INTERNAL MEDICINE

## 2024-08-21 PROCEDURE — 3078F DIAST BP <80 MM HG: CPT | Performed by: INTERNAL MEDICINE

## 2024-08-21 PROCEDURE — 1036F TOBACCO NON-USER: CPT | Performed by: INTERNAL MEDICINE

## 2024-08-21 ASSESSMENT — ASTHMA QUESTIONNAIRES
QUESTION_4 LAST FOUR WEEKS HOW OFTEN HAVE YOU USED YOUR RESCUE INHALER OR NEBULIZER MEDICATION (SUCH AS ALBUTEROL): 2 OR 3 TIMES PER WEEK
ACT_TOTALSCORE: 17
QUESTION_2 LAST FOUR WEEKS HOW OFTEN HAVE YOU HAD SHORTNESS OF BREATH: 1 OR 2 TIMES PER WEEK
QUESTION_3 LAST FOUR WEEKS HOW OFTEN DID YOUR ASTHMA SYMPTOMS (WHEEZING, COUGHING, SHORTNESS OF BREATH, CHEST TIGHTNESS OR PAIN) WAKE YOU UP AT NIGHT OR EARLIER THAN USUAL IN THE MORNING: ONCE OR TWICE
QUESTION_5 LAST FOUR WEEKS HOW WOULD YOU RATE YOUR ASTHMA CONTROL: SOMEWHAT CONTROLLED
QUESTION_1 LAST FOUR WEEKS HOW MUCH OF THE TIME DID YOUR ASTHMA KEEP YOU FROM GETTING AS MUCH DONE AT WORK, SCHOOL OR AT HOME: MOST OF THE TIME

## 2024-08-21 ASSESSMENT — ENCOUNTER SYMPTOMS
DEPRESSION: 1
LOSS OF SENSATION IN FEET: 0
OCCASIONAL FEELINGS OF UNSTEADINESS: 0

## 2024-08-21 NOTE — LETTER
"August 21, 2024     Shayy Contreras MD  [address]  [facility], [address] [address]    Patient: Katarina Hernandez   YOB: 1945   Date of Visit: 8/21/2024       Dear Dr. Shayy Contreras MD:    Thank you for referring Katarina Hernandez to me for evaluation. Below are my notes for this consultation.  If you have questions, please do not hesitate to call me. I look forward to following your patient along with you.       Sincerely,     Aaron Jose MD      CC: No Recipients  ______________________________________________________________________________________    Interval 8/21/2024  Katarina is doing very well.  She is using the Trelegy daily.  She only gets short of breath when it is hard side and she tries to stay inside in the air conditioner.  No exacerbation since I last saw her.  No weight loss loss of appetite fever chills night sweats.    Cat 17.  MMRC 2    PE:  /61   Pulse 72   Temp 36.3 °C (97.3 °F)   Resp 20   Wt 99.8 kg (220 lb)   SpO2 97% Comment: Using 2 liters of oxygen  BMI 41.57 kg/m²   Lungs: Diminished breath sounds bilaterally but no wheezing    Interim 12/6/2023     Nov 23 she had an episode upper respiratory infection with worsening shortness of breath and cough lending her an emergency room visit.  She was given doxycycline and benzonatate.  She feels a lot better and her cough is clearing up with continued whitish sputum.  She also describes runny nose.  She is using her Trelegy daily.  She feels about 60% better but she still have shortness of breath.  She uses 2 liters with her CPAP and 2 at rest and 3 when walking    PE: /52   Pulse 75   Temp 35.9 °C (96.6 °F)   Ht 1.549 m (5' 1\")   Wt 100 kg (221 lb)   SpO2 100%   BMI 41.76 kg/m²   Lungs: bilateral wheezing      Interim 06 2023   she is more tired than usual. She is compliant with her CPAP and following up with the sleep doctor. She remains active but not as " much because of the fatigue and independent of her activities of daily living.   She is mMRC 3. She uses her oxygen all the time. No fever or chills or night sweats or weight loss or loss of appetite. No sick visits for COPD exacerbation or emergency room visits. No prednisone burst since I last saw her. She does have nocturnal cough but that is not bothering her. Denies reflux and postnasal drip. vaccinated for the flu pneumonia utd   Because of insurance coverage since she switched to the Wixela instead of the Advair and she is currently contemplating getting Spiriva to supplement.     Lungs: diminished BS bilaterally but no wheezes or crackles        interim 12 2 2022   SOB is the same. She is mMRC 3. She continues to be active and independent of her activities of daily living. She uses her oxygen all the time. No fever or chills or night sweats or weight loss or loss of appetite. No sick visits for COPD exacerbation or emergency room visits. No prednisone burst since I last saw her. She does have nocturnal cough but that is not bothering her. Denies reflux and postnasal drip.   vaccinated for the flu   pneumonia utd      Interim 08 2022   I last saw her in April 2022. Since I last saw her she feels stable She is still independent in her activities of daily living. She uses her oxygen all the time except at night. a nocturnal pulse ox while on CPAP showed desaturation. She lives with her  who just suffered a stroke and she helps him. No COPD exacerbations since I last saw her, no prednisone burst and no ER visits for any COPD issues. She denies any fever or chills or night sweats. she lost about 11lbs since last I saw her. She is compliant with her CPAP and her inhalers      CAT score 22   ACT 15     Interim 04 05 2022  I last saw her in December 2021. Since I last saw her she feels stable except for worsening wheezing and perhaps slight worsening of her cough in the last month or so. She is still  independent in her activities of daily living. She uses her oxygen all the time except at night. She lives by herself. No COPD exacerbations since I last saw her, no prednisone burst and no ER visits for any COPD issues. She denies any fever or chills or night sweats or weight loss or loss of appetite. She is compliant with her CPAP.     Initial  74 yo F morbidly obese and past smoker quit in 1989, 20 pack year smoker, CAD, DM2, obesity, AMY, UC, Ashtma-COPD, Chronic respiratory failure 2L exertion since Jan 2020 and sleep and 3L with portable tank when not using CPAP for AMY, Lung nodules, Chronic sinusitis, CAD, HTN, DM2   Since last visit doing OK, able to do most of her ADL;s without assistance. been on oxygen for about 2 years.  No ER visits or hospitalizations in the last 6 months to one year. uses her inhalers regularly. the incruseuse ellipta helped a lot. Have not done her stationary bike in the last month or so. she has some cough not bothersome. no fever or chills or LAWRENCE or weight loss. not on chronic prednisone but goes on prednisone couple of times but none since last year      Inhalers:  Advair 500/50 mcg 1 puff twice a day and rinse after  Albuterol MDI  Incruse 1 time a day   Albuterol neb broken- will give Rx         Social Hx: past smoker as outlined above, used to work for the [employer], retired 2007  No birds, hot tub, molds, toxic exposures- in one of the buildings there was a problem with asbestos- some people she worked with are having lung issues from asbestos- exposed [id number] while working at the [employer] [facility] [employer].     Fhx: CAD, no known lung disease, no ILDs     Surgical Hx: Appendectomy, C section     PMHx: UC, Ashtma-COPD, Chronic respiratory failure 2L exertion and sleep and 3L with portable tank when not using CPAP, Lung nodules, Chronic sinusitis, CAD, HTN, DM2         PE: 12 2022  VS noted   CTA mild crackles at the bases          Diag data   Per prior documentation- PFT back in a February 2019 showed moderate obstructive ventilatory defect with increase post bronchodilator response by 20%  PFTs I have personally visualized the most recent pulmonary function testing results.     Date  FEV1/FVC FVC L(%) FEV1 L(%) TLC L(%) RV/TLC DLCO c( %)  1202023 63  1.55  0.98  06 2023 62, 1.54, .96 ; 9.8 (54%)  08/2022 .64, 1.67 (84%) 1 (72%), 11.5 (63%)  11/2021 0.6 1.47(74%) 0.9(56%) 128% 53% 10 (55%)   2/2019 1.48 0.97 (post BD, 20% BD response) 12     Six mn walk test   06 2023; 3 LNC 99%-99% walked about 226m   11 2021 3LNC, 182 m ; 100-99%      Chest CT multiple dating back to 2019 show stable fibrotic changes in an upper and mild lung zones predominance in a pattern suggestive of HP and indeterminant for UI P  CHest CT July stable findings compared to last year   CHest CT from May 2023 stable compared to June 2021     SABA, RF CCP none  Biopsy : none   Hypersensitivity panel neg   RF slightly up      TTE July 2022    Left Ventricle: The left ventricular systolic function is normal, with an estimated ejection fraction of 65%. There are no regional wall motion abnormalities. The left ventricular cavity size is normal. Spectral Doppler shows a normal pattern of left ventricular diastolic filling.  Left Atrium: The left atrium is normal in size.  Right Ventricle: The right ventricle is normal in size. There is normal right ventricular global systolic function.  Right Atrium: The right atrium is normal in size.  Aortic Valve: The aortic valve is trileaflet. There is mild aortic valve cusp calcification. There is trace to mild aortic valve regurgitation. The peak instantaneous gradient of the aortic valve is 9.2 mmHg.  Mitral Valve: The mitral valve is normal in structure. There is no evidence of mitral valve regurgitation.  Tricuspid Valve: The tricuspid valve is structurally normal. No evidence of tricuspid regurgitation.  Pulmonic Valve: The pulmonic  valve is structurally normal. There is no indication of pulmonic valve regurgitation.  Pericardium: There is no pericardial effusion noted.  Aorta: The aortic root is normal. There is no dilatation of the ascending aorta.  Systemic Veins: The inferior vena cava appears to be of normal size. There is IVC inspiratory collapse greater than 50%.         Provider Impressions and Plan:     This is a 78-year-old female prior smoker but who quit more than 20 years ago with:     1.COPD/Asthma overlap; Moderate obstructive lung disease on pulmonary function test dating back to at least 2019 and that is not any worse over the last couple of years. She previously had a 20% bronchodilator response. She is responding well to LABA, LAMA and ICS therapy. She does have 1-2 COPD exacerbation per year Not requiring hospitalization.  Most recent and in November the last year Group B (CAT =29 --17 8/21/2024  and MMRC 3 --2 8/21/2024)  -trelegy   -Utd on vaccines   -Pulm rehab referral        2.Diffuse parenchymal lung disease on a chest CT dating back to at least 2019. Per chart review this was also present when she was followed up at [facility] 5 or 6 years ago. She never had a biopsy. The pattern is indeterminate for UIP and suggestive of hypersensitivity pneumonitis. No exposures whatsoever. HP panel neg.  stable clinically. by testing ? a slight decline on todays PFT. HRCT stable --> continue watching and repeat tetsing in 6 monthds      3. Morbid obesity BMI 42        4.AMY  - on CPAP and following with sleep -nocturnal Oximetry on CPAP showed desaturation and we will prescribe O2 to be blended      5. Hypoxemic resp failure   In the setting of #1 and #2  Needs updated 6 mn walk testing before next visit     6.scored yes on the steadi questionnaire and we will email the PCP     RTC in 6 months w testing

## 2024-08-21 NOTE — PATIENT INSTRUCTIONS
Dear Katarina Hernandez It was nice seeing you today. We discussed the following:     You are doing great  Continue trelegy   I will see you back in follow-up in 6 months with testing     For scheduling purposes:    Call [phone] to schedule a breathing or a walking test   Should you have any questions Please Call my assistant Marylu Ford at [phone] or our pulmonary nurse Saloni Walter [phone].

## 2024-08-21 NOTE — LETTER
"August 21, 2024     No Recipients    Patient: Katarina Hernandez   YOB: 1945   Date of Visit: 8/21/2024       Dear Dr. Pedro Recipients:    Thank you for referring Katarina Hernandez to me for evaluation. Below are my notes for this consultation.  If you have questions, please do not hesitate to call me. I look forward to following your patient along with you.       Sincerely,     Aaron Jose MD      CC: No Recipients  ______________________________________________________________________________________    Interval 8/21/2024  Katarina is doing very well.  She is using the Trelegy daily.  She only gets short of breath when it is hard side and she tries to stay inside in the air conditioner.  No exacerbation since I last saw her.  No weight loss loss of appetite fever chills night sweats.    Cat 17.  MMRC 2    PE:  /61   Pulse 72   Temp 36.3 °C (97.3 °F)   Resp 20   Wt 99.8 kg (220 lb)   SpO2 97% Comment: Using 2 liters of oxygen  BMI 41.57 kg/m²   Lungs: Diminished breath sounds bilaterally but no wheezing    Interim 12/6/2023     Nov 23 she had an episode upper respiratory infection with worsening shortness of breath and cough lending her an emergency room visit.  She was given doxycycline and benzonatate.  She feels a lot better and her cough is clearing up with continued whitish sputum.  She also describes runny nose.  She is using her Trelegy daily.  She feels about 60% better but she still have shortness of breath.  She uses 2 liters with her CPAP and 2 at rest and 3 when walking    PE: /52   Pulse 75   Temp 35.9 °C (96.6 °F)   Ht 1.549 m (5' 1\")   Wt 100 kg (221 lb)   SpO2 100%   BMI 41.76 kg/m²   Lungs: bilateral wheezing      Interim 06 2023   she is more tired than usual. She is compliant with her CPAP and following up with the sleep doctor. She remains active but not as much because of the fatigue and independent of her activities of daily living.   She is mMRC 3. She uses " her oxygen all the time. No fever or chills or night sweats or weight loss or loss of appetite. No sick visits for COPD exacerbation or emergency room visits. No prednisone burst since I last saw her. She does have nocturnal cough but that is not bothering her. Denies reflux and postnasal drip. vaccinated for the flu pneumonia utd   Because of insurance coverage since she switched to the Wixela instead of the Advair and she is currently contemplating getting Spiriva to supplement.     Lungs: diminished BS bilaterally but no wheezes or crackles        interim 12 2 2022   SOB is the same. She is mMRC 3. She continues to be active and independent of her activities of daily living. She uses her oxygen all the time. No fever or chills or night sweats or weight loss or loss of appetite. No sick visits for COPD exacerbation or emergency room visits. No prednisone burst since I last saw her. She does have nocturnal cough but that is not bothering her. Denies reflux and postnasal drip.   vaccinated for the flu   pneumonia utd      Interim 08 2022   I last saw her in April 2022. Since I last saw her she feels stable She is still independent in her activities of daily living. She uses her oxygen all the time except at night. a nocturnal pulse ox while on CPAP showed desaturation. She lives with her  who just suffered a stroke and she helps him. No COPD exacerbations since I last saw her, no prednisone burst and no ER visits for any COPD issues. She denies any fever or chills or night sweats. she lost about 11lbs since last I saw her. She is compliant with her CPAP and her inhalers      CAT score 22   ACT 15     Interim 04 05 2022  I last saw her in December 2021. Since I last saw her she feels stable except for worsening wheezing and perhaps slight worsening of her cough in the last month or so. She is still independent in her activities of daily living. She uses her oxygen all the time except at night. She lives by  herself. No COPD exacerbations since I last saw her, no prednisone burst and no ER visits for any COPD issues. She denies any fever or chills or night sweats or weight loss or loss of appetite. She is compliant with her CPAP.     Initial  74 yo F morbidly obese and past smoker quit in 1989, 20 pack year smoker, CAD, DM2, obesity, AMY, UC, Ashtma-COPD, Chronic respiratory failure 2L exertion since Jan 2020 and sleep and 3L with portable tank when not using CPAP for AMY, Lung nodules, Chronic sinusitis, CAD, HTN, DM2   Since last visit doing OK, able to do most of her ADL;s without assistance. been on oxygen for about 2 years.  No ER visits or hospitalizations in the last 6 months to one year. uses her inhalers regularly. the incruseuse ellipta helped a lot. Have not done her stationary bike in the last month or so. she has some cough not bothersome. no fever or chills or LAWRENCE or weight loss. not on chronic prednisone but goes on prednisone couple of times but none since last year      Inhalers:  Advair 500/50 mcg 1 puff twice a day and rinse after  Albuterol MDI  Incruse 1 time a day   Albuterol neb broken- will give Rx         Social Hx: past smoker as outlined above, used to work for the [employer], retired 2007  No birds, hot tub, molds, toxic exposures- in one of the buildings there was a problem with asbestos- some people she worked with are having lung issues from asbestos- exposed [id number] while working at the [employer].     Fhx: CAD, no known lung disease, no ILDs     Surgical Hx: Appendectomy, C section     PMHx: UC, Ashtma-COPD, Chronic respiratory failure 2L exertion and sleep and 3L with portable tank when not using CPAP, Lung nodules, Chronic sinusitis, CAD, HTN, DM2         PE: 12 2022  VS noted   CTA mild crackles at the bases         Diag data   Per prior documentation- PFT back in a February 2019 showed moderate obstructive ventilatory defect  with increase post bronchodilator response by 20%  PFTs I have personally visualized the most recent pulmonary function testing results.     Date  FEV1/FVC FVC L(%) FEV1 L(%) TLC L(%) RV/TLC DLCO c( %)  1202023 63  1.55  0.98  06 2023 62, 1.54, .96 ; 9.8 (54%)  08/2022 .64, 1.67 (84%) 1 (72%), 11.5 (63%)  11/2021 0.6 1.47(74%) 0.9(56%) 128% 53% 10 (55%)   2/2019 1.48 0.97 (post BD, 20% BD response) 12     Six mn walk test   06 2023; 3 LNC 99%-99% walked about 226m   11 2021 3LNC, 182 m ; 100-99%      Chest CT multiple dating back to 2019 show stable fibrotic changes in an upper and mild lung zones predominance in a pattern suggestive of HP and indeterminant for UI P  CHest CT July stable findings compared to last year   CHest CT from May 2023 stable compared to June 2021     SABA, RF CCP none  Biopsy : none   Hypersensitivity panel neg   RF slightly up      TTE July 2022    Left Ventricle: The left ventricular systolic function is normal, with an estimated ejection fraction of 65%. There are no regional wall motion abnormalities. The left ventricular cavity size is normal. Spectral Doppler shows a normal pattern of left ventricular diastolic filling.  Left Atrium: The left atrium is normal in size.  Right Ventricle: The right ventricle is normal in size. There is normal right ventricular global systolic function.  Right Atrium: The right atrium is normal in size.  Aortic Valve: The aortic valve is trileaflet. There is mild aortic valve cusp calcification. There is trace to mild aortic valve regurgitation. The peak instantaneous gradient of the aortic valve is 9.2 mmHg.  Mitral Valve: The mitral valve is normal in structure. There is no evidence of mitral valve regurgitation.  Tricuspid Valve: The tricuspid valve is structurally normal. No evidence of tricuspid regurgitation.  Pulmonic Valve: The pulmonic valve is structurally normal. There is no indication of pulmonic valve regurgitation.  Pericardium: There is no  pericardial effusion noted.  Aorta: The aortic root is normal. There is no dilatation of the ascending aorta.  Systemic Veins: The inferior vena cava appears to be of normal size. There is IVC inspiratory collapse greater than 50%.         Provider Impressions and Plan:     This is a 78-year-old female prior smoker but who quit more than 20 years ago with:     1.COPD/Asthma overlap; Moderate obstructive lung disease on pulmonary function test dating back to at least 2019 and that is not any worse over the last couple of years. She previously had a 20% bronchodilator response. She is responding well to LABA, LAMA and ICS therapy. She does have 1-2 COPD exacerbation per year Not requiring hospitalization.  Most recent and in November the last year Group B (CAT =29 --17 8/21/2024  and MMRC 3 --2 8/21/2024)  -trelegy   -Utd on vaccines   -Pulm rehab referral        2.Diffuse parenchymal lung disease on a chest CT dating back to at least 2019. Per chart review this was also present when she was followed up at [facility] 5 or 6 years ago. She never had a biopsy. The pattern is indeterminate for UIP and suggestive of hypersensitivity pneumonitis. No exposures whatsoever. HP panel neg.  stable clinically. by testing ? a slight decline on todays PFT. HRCT stable --> continue watching and repeat tetsing in 6 monthds      3. Morbid obesity BMI 42        4.AMY  - on CPAP and following with sleep -nocturnal Oximetry on CPAP showed desaturation and we will prescribe O2 to be blended      5. Hypoxemic resp failure   In the setting of #1 and #2  Needs updated 6 mn walk testing before next visit     6.scored yes on the steadi questionnaire and we will email the PCP     RTC in 6 months w testing

## 2024-08-21 NOTE — PROGRESS NOTES
"Interval 8/21/2024  Katarina is doing very well.  She is using the Trelegy daily.  She only gets short of breath when it is hard side and she tries to stay inside in the air conditioner.  No exacerbation since I last saw her.  No weight loss loss of appetite fever chills night sweats.    Cat 17.  MMRC 2    PE:  /61   Pulse 72   Temp 36.3 °C (97.3 °F)   Resp 20   Wt 99.8 kg (220 lb)   SpO2 97% Comment: Using 2 liters of oxygen  BMI 41.57 kg/m²   Lungs: Diminished breath sounds bilaterally but no wheezing    Interim 12/6/2023 Nov 23 she had an episode upper respiratory infection with worsening shortness of breath and cough lending her an emergency room visit.  She was given doxycycline and benzonatate.  She feels a lot better and her cough is clearing up with continued whitish sputum.  She also describes runny nose.  She is using her Trelegy daily.  She feels about 60% better but she still have shortness of breath.  She uses 2 liters with her CPAP and 2 at rest and 3 when walking    PE: /52   Pulse 75   Temp 35.9 °C (96.6 °F)   Ht 1.549 m (5' 1\")   Wt 100 kg (221 lb)   SpO2 100%   BMI 41.76 kg/m²   Lungs: bilateral wheezing      Interim 06 2023   she is more tired than usual. She is compliant with her CPAP and following up with the sleep doctor. She remains active but not as much because of the fatigue and independent of her activities of daily living.   She is mMRC 3. She uses her oxygen all the time. No fever or chills or night sweats or weight loss or loss of appetite. No sick visits for COPD exacerbation or emergency room visits. No prednisone burst since I last saw her. She does have nocturnal cough but that is not bothering her. Denies reflux and postnasal drip. vaccinated for the flu pneumonia utd   Because of insurance coverage since she switched to the Wixela instead of the Advair and she is currently contemplating getting Spiriva to supplement.     Lungs: diminished BS bilaterally " but no wheezes or crackles        interim 12 2 2022   SOB is the same. She is mMRC 3. She continues to be active and independent of her activities of daily living. She uses her oxygen all the time. No fever or chills or night sweats or weight loss or loss of appetite. No sick visits for COPD exacerbation or emergency room visits. No prednisone burst since I last saw her. She does have nocturnal cough but that is not bothering her. Denies reflux and postnasal drip.   vaccinated for the flu   pneumonia utd      Interim 08 2022   I last saw her in April 2022. Since I last saw her she feels stable She is still independent in her activities of daily living. She uses her oxygen all the time except at night. a nocturnal pulse ox while on CPAP showed desaturation. She lives with her  who just suffered a stroke and she helps him. No COPD exacerbations since I last saw her, no prednisone burst and no ER visits for any COPD issues. She denies any fever or chills or night sweats. she lost about 11lbs since last I saw her. She is compliant with her CPAP and her inhalers      CAT score 22   ACT 15     Interim 04 05 2022  I last saw her in December 2021. Since I last saw her she feels stable except for worsening wheezing and perhaps slight worsening of her cough in the last month or so. She is still independent in her activities of daily living. She uses her oxygen all the time except at night. She lives by herself. No COPD exacerbations since I last saw her, no prednisone burst and no ER visits for any COPD issues. She denies any fever or chills or night sweats or weight loss or loss of appetite. She is compliant with her CPAP.     Initial  74 yo F morbidly obese and past smoker quit in 1989, 20 pack year smoker, CAD, DM2, obesity, AMY, UC, Ashtma-COPD, Chronic respiratory failure 2L exertion since Jan 2020 and sleep and 3L with portable tank when not using CPAP for AMY, Lung nodules, Chronic sinusitis, CAD, HTN, DM2    Since last visit doing OK, able to do most of her ADL;s without assistance. been on oxygen for about 2 years.  No ER visits or hospitalizations in the last 6 months to one year. uses her inhalers regularly. the incruseuse ellipta helped a lot. Have not done her stationary bike in the last month or so. she has some cough not bothersome. no fever or chills or LAWRENCE or weight loss. not on chronic prednisone but goes on prednisone couple of times but none since last year      Inhalers:  Advair 500/50 mcg 1 puff twice a day and rinse after  Albuterol MDI  Incruse 1 time a day   Albuterol neb broken- will give Rx         Social Hx: past smoker as outlined above, used to work for the [employer] and [employer], retired 2007  No birds, hot tub, molds, toxic exposures- in one of the buildings there was a problem with asbestos- some people she worked with are having lung issues from asbestos- exposed [phone] while working at the [employer].     Fhx: CAD, no known lung disease, no ILDs     Surgical Hx: Appendectomy, C section     PMHx: UC, Ashtma-COPD, Chronic respiratory failure 2L exertion and sleep and 3L with portable tank when not using CPAP, Lung nodules, Chronic sinusitis, CAD, HTN, DM2         PE: 12 2022  VS noted   CTA mild crackles at the bases         Diag data   Per prior documentation- PFT back in a February 2019 showed moderate obstructive ventilatory defect with increase post bronchodilator response by 20%  PFTs I have personally visualized the most recent pulmonary function testing results.     Date  FEV1/FVC FVC L(%) FEV1 L(%) TLC L(%) RV/TLC DLCO c( %)  1202023 63  1.55  0.98  06 2023 62, 1.54, .96 ; 9.8 (54%)  08/2022 .64, 1.67 (84%) 1 (72%), 11.5 (63%)  11/2021 0.6 1.47(74%) 0.9(56%) 128% 53% 10 (55%)   2/2019 1.48 0.97 (post BD, 20% BD response) 12     Six mn walk test   06 2023; 3 LNC 99%-99% walked about 226m   11 2021 3LNC, 182 m ; 100-99%      Chest CT  multiple dating back to 2019 show stable fibrotic changes in an upper and mild lung zones predominance in a pattern suggestive of HP and indeterminant for UI P  CHest CT July stable findings compared to last year   CHest CT from May 2023 stable compared to June 2021     SABA, RF CCP none  Biopsy : none   Hypersensitivity panel neg   RF slightly up      TTE July 2022    Left Ventricle: The left ventricular systolic function is normal, with an estimated ejection fraction of 65%. There are no regional wall motion abnormalities. The left ventricular cavity size is normal. Spectral Doppler shows a normal pattern of left ventricular diastolic filling.  Left Atrium: The left atrium is normal in size.  Right Ventricle: The right ventricle is normal in size. There is normal right ventricular global systolic function.  Right Atrium: The right atrium is normal in size.  Aortic Valve: The aortic valve is trileaflet. There is mild aortic valve cusp calcification. There is trace to mild aortic valve regurgitation. The peak instantaneous gradient of the aortic valve is 9.2 mmHg.  Mitral Valve: The mitral valve is normal in structure. There is no evidence of mitral valve regurgitation.  Tricuspid Valve: The tricuspid valve is structurally normal. No evidence of tricuspid regurgitation.  Pulmonic Valve: The pulmonic valve is structurally normal. There is no indication of pulmonic valve regurgitation.  Pericardium: There is no pericardial effusion noted.  Aorta: The aortic root is normal. There is no dilatation of the ascending aorta.  Systemic Veins: The inferior vena cava appears to be of normal size. There is IVC inspiratory collapse greater than 50%.         Provider Impressions and Plan:     This is a 78-year-old female prior smoker but who quit more than 20 years ago with:     1.COPD/Asthma overlap; Moderate obstructive lung disease on pulmonary function test dating back to at least 2019 and that is not any worse over the last  couple of years. She previously had a 20% bronchodilator response. She is responding well to LABA, LAMA and ICS therapy. She does have 1-2 COPD exacerbation per year Not requiring hospitalization.  Most recent and in November the last year Group B (CAT =29 --17 8/21/2024  and MMRC 3 --2 8/21/2024)  -trelegy   -Utd on vaccines   -Pulm rehab referral        2.Diffuse parenchymal lung disease on a chest CT dating back to at least 2019. Per chart review this was also present when she was followed up at [facility] 5 or 6 years ago. She never had a biopsy. The pattern is indeterminate for UIP and suggestive of hypersensitivity pneumonitis. No exposures whatsoever. HP panel neg.  stable clinically. by testing ? a slight decline on todays PFT. HRCT stable --> continue watching and repeat tetsing in 6 monthds      3. Morbid obesity BMI 42        4.AMY  - on CPAP and following with sleep -nocturnal Oximetry on CPAP showed desaturation and we will prescribe O2 to be blended      5. Hypoxemic resp failure   In the setting of #1 and #2  Needs updated 6 mn walk testing before next visit     6.scored yes on the steadi questionnaire and we will email the PCP     RTC in 6 months w testing

## 2024-08-26 DIAGNOSIS — N39.0 URINARY TRACT INFECTION WITHOUT HEMATURIA, SITE UNSPECIFIED: Primary | ICD-10-CM

## 2024-08-26 RX ORDER — CEFPODOXIME PROXETIL 100 MG/1
100 TABLET, FILM COATED ORAL 2 TIMES DAILY
Qty: 10 TABLET | Refills: 0 | Status: SHIPPED | OUTPATIENT
Start: 2024-08-26 | End: 2024-08-31

## 2024-08-26 NOTE — PROGRESS NOTES
Uti sypyoms  Past urrinalysis shows uti   Denied symptoms at that time     Allergies noted   Reports tolerated keflex well in the past   Can try vantin   Patient verbalizes understanding of benefits and risks. Agreeable to plan   To call back if has any side effects

## 2024-08-28 ENCOUNTER — DOCUMENTATION (OUTPATIENT)
Dept: PHYSICAL THERAPY | Facility: CLINIC | Age: 79
End: 2024-08-28
Payer: MEDICARE

## 2024-08-28 NOTE — PROGRESS NOTES
Physical Therapy    Discharge Summary    Name: Katarina Hernandez  MRN: [id number]  : 1945  Date: 24    Discharge Summary: PT    Discharge Information: Date of discharge 24, Date of last visit 24, Date of evaluation 24, Number of attended visits 6, Referred by Ben, and Referred for shoulder and back pain.    Therapy Summary: Patient seen in PT for 6 visits for back and shoulder pain.  Patient's shoulder pain resolved but was still having high levels of back pain.  Patient advised to return to doctor secondary continued high back pain levels.  Patient did not follow up in PT after seeing doctor.    Discharge Status: Status unknown.     Rehab Discharge Reason: Other Patient with resolved shoulder pain - advised to return to doctor regarding continued high levels of back pain.

## 2024-08-30 ENCOUNTER — TELEPHONE (OUTPATIENT)
Dept: PRIMARY CARE | Facility: CLINIC | Age: 79
End: 2024-08-30
Payer: MEDICARE

## 2024-08-30 DIAGNOSIS — Z79.4 TYPE 2 DIABETES MELLITUS WITHOUT COMPLICATION, WITH LONG-TERM CURRENT USE OF INSULIN (MULTI): ICD-10-CM

## 2024-08-30 DIAGNOSIS — E11.9 TYPE 2 DIABETES MELLITUS WITHOUT COMPLICATIONS (MULTI): ICD-10-CM

## 2024-08-30 DIAGNOSIS — E11.9 TYPE 2 DIABETES MELLITUS WITHOUT COMPLICATION, WITH LONG-TERM CURRENT USE OF INSULIN (MULTI): ICD-10-CM

## 2024-08-30 RX ORDER — HUMAN INSULIN 100 [IU]/ML
32 INJECTION, SUSPENSION SUBCUTANEOUS
Qty: 80 ML | Refills: 0 | Status: SHIPPED | OUTPATIENT
Start: 2024-08-30

## 2024-08-30 RX ORDER — TIRZEPATIDE 5 MG/.5ML
5 INJECTION, SOLUTION SUBCUTANEOUS WEEKLY
Qty: 2 ML | Refills: 1 | Status: SHIPPED | OUTPATIENT
Start: 2024-08-30 | End: 2024-10-25

## 2024-09-08 DIAGNOSIS — K21.9 GASTROESOPHAGEAL REFLUX DISEASE, UNSPECIFIED WHETHER ESOPHAGITIS PRESENT: ICD-10-CM

## 2024-09-10 RX ORDER — PANTOPRAZOLE SODIUM 40 MG/1
TABLET, DELAYED RELEASE ORAL
Qty: 90 TABLET | Refills: 3 | Status: SHIPPED | OUTPATIENT
Start: 2024-09-10

## 2024-09-13 DIAGNOSIS — E78.5 HYPERLIPIDEMIA, UNSPECIFIED HYPERLIPIDEMIA TYPE: ICD-10-CM

## 2024-09-13 DIAGNOSIS — Z79.4 TYPE 2 DIABETES MELLITUS WITHOUT COMPLICATION, WITH LONG-TERM CURRENT USE OF INSULIN (MULTI): ICD-10-CM

## 2024-09-13 DIAGNOSIS — E11.9 TYPE 2 DIABETES MELLITUS WITHOUT COMPLICATION, WITH LONG-TERM CURRENT USE OF INSULIN (MULTI): ICD-10-CM

## 2024-09-13 RX ORDER — PEN NEEDLE, DIABETIC 29 G X1/2"
NEEDLE, DISPOSABLE MISCELLANEOUS
Qty: 100 EACH | Refills: 2 | Status: SHIPPED | OUTPATIENT
Start: 2024-09-13

## 2024-09-13 RX ORDER — ATORVASTATIN CALCIUM 40 MG/1
40 TABLET, FILM COATED ORAL NIGHTLY
Qty: 90 TABLET | Refills: 2 | Status: SHIPPED | OUTPATIENT
Start: 2024-09-13

## 2024-09-18 DIAGNOSIS — E11.9 TYPE 2 DIABETES MELLITUS WITHOUT COMPLICATIONS (MULTI): ICD-10-CM

## 2024-09-18 DIAGNOSIS — J30.9 ALLERGIC RHINITIS, UNSPECIFIED SEASONALITY, UNSPECIFIED TRIGGER: ICD-10-CM

## 2024-09-19 RX ORDER — MONTELUKAST SODIUM 10 MG/1
10 TABLET ORAL DAILY
Qty: 90 TABLET | Refills: 0 | Status: SHIPPED | OUTPATIENT
Start: 2024-09-19

## 2024-09-19 RX ORDER — CLOPIDOGREL BISULFATE 75 MG/1
75 TABLET ORAL DAILY
Qty: 90 TABLET | Refills: 1 | Status: SHIPPED | OUTPATIENT
Start: 2024-09-19

## 2024-09-24 DIAGNOSIS — E11.9 TYPE 2 DIABETES MELLITUS WITHOUT COMPLICATIONS (MULTI): ICD-10-CM

## 2024-09-24 RX ORDER — TIRZEPATIDE 5 MG/.5ML
5 INJECTION, SOLUTION SUBCUTANEOUS WEEKLY
Qty: 2 ML | Refills: 1 | Status: SHIPPED | OUTPATIENT
Start: 2024-09-24 | End: 2024-11-19

## 2024-10-10 ENCOUNTER — APPOINTMENT (OUTPATIENT)
Dept: OPHTHALMOLOGY | Facility: CLINIC | Age: 79
End: 2024-10-10
Payer: MEDICARE

## 2024-10-10 DIAGNOSIS — H40.1131 PRIMARY OPEN ANGLE GLAUCOMA (POAG) OF BOTH EYES, MILD STAGE: Primary | ICD-10-CM

## 2024-10-10 PROCEDURE — 92083 EXTENDED VISUAL FIELD XM: CPT | Performed by: OPHTHALMOLOGY

## 2024-10-10 PROCEDURE — 99214 OFFICE O/P EST MOD 30 MIN: CPT | Performed by: OPHTHALMOLOGY

## 2024-10-10 RX ORDER — LATANOPROST 50 UG/ML
1 SOLUTION/ DROPS OPHTHALMIC NIGHTLY
Qty: 7.5 ML | Refills: 3 | Status: SHIPPED | OUTPATIENT
Start: 2024-10-10 | End: 2025-10-10

## 2024-10-10 ASSESSMENT — CUP TO DISC RATIO
OS_RATIO: 0.5
OD_RATIO: 0.4

## 2024-10-10 ASSESSMENT — TONOMETRY
OS_IOP_MMHG: 17
OD_IOP_MMHG: 17
IOP_METHOD: GOLDMANN APPLANATION

## 2024-10-10 ASSESSMENT — CONF VISUAL FIELD
OD_INFERIOR_NASAL_RESTRICTION: 0
OS_INFERIOR_NASAL_RESTRICTION: 0
OD_INFERIOR_TEMPORAL_RESTRICTION: 0
OD_NORMAL: 1
OD_SUPERIOR_TEMPORAL_RESTRICTION: 0
OD_SUPERIOR_NASAL_RESTRICTION: 0
OS_SUPERIOR_TEMPORAL_RESTRICTION: 0
OS_INFERIOR_TEMPORAL_RESTRICTION: 0
OS_SUPERIOR_NASAL_RESTRICTION: 0
OS_NORMAL: 1

## 2024-10-10 ASSESSMENT — PACHYMETRY
OD_CT(UM): 503
OS_CT(UM): 506

## 2024-10-10 ASSESSMENT — VISUAL ACUITY
OD_CC: 20/20
OS_CC: 20/20
METHOD: SNELLEN - LINEAR
CORRECTION_TYPE: GLASSES

## 2024-10-10 ASSESSMENT — ENCOUNTER SYMPTOMS
ALLERGIC/IMMUNOLOGIC NEGATIVE: 0
EYES NEGATIVE: 1
HEMATOLOGIC/LYMPHATIC NEGATIVE: 0
GASTROINTESTINAL NEGATIVE: 0
RESPIRATORY NEGATIVE: 0
NEUROLOGICAL NEGATIVE: 0
CARDIOVASCULAR NEGATIVE: 0
PSYCHIATRIC NEGATIVE: 0
MUSCULOSKELETAL NEGATIVE: 0
ENDOCRINE NEGATIVE: 0
CONSTITUTIONAL NEGATIVE: 0

## 2024-10-10 ASSESSMENT — REFRACTION_WEARINGRX
OS_SPHERE: -2.50
OS_CYLINDER: +0.75
OS_ADD: +2.75
OD_CYLINDER: +1.00
OD_ADD: +2.75
OS_AXIS: 120
OD_SPHERE: -3.00
OD_AXIS: 132

## 2024-10-10 ASSESSMENT — SLIT LAMP EXAM - LIDS
COMMENTS: NORMAL
COMMENTS: NORMAL

## 2024-10-10 ASSESSMENT — EXTERNAL EXAM - LEFT EYE: OS_EXAM: NORMAL

## 2024-10-10 ASSESSMENT — EXTERNAL EXAM - RIGHT EYE: OD_EXAM: NORMAL

## 2024-10-10 NOTE — PROGRESS NOTES
primary open angle glaucoma, both eyes mild stage   +family hx in father, aunts, no hx of blindness   aa race   thinner pachs   tmax 30/33 (new today, not on any drops)   inhaled/nasal steroids   gonio open   no trauma  Pedro Visual Field - OU - Both Eyes          Nsd OD  Nasal step OS   No evidence of prgressive disease OU             OCT, Optic Nerve - OU - Both Eyes          Right Eye  Images reviewed and comparison made to baseline.     Left Eye  Images reviewed and comparison made to baseline.     Notes  Concern for progression OU however exam limited by artifact         OCT, Retina - OU - Both Eyes          Right Eye  Findings include normal observations.     Left Eye  Findings include normal observations.     Notes  Retinal multimodal imaging including photography was completed, and the findings are described in the examination.           C/D asymmetry with OS>OD   long discussion with patient re-pathophysiology of glaucoma, potential blinding nature of disease   s/p SLT 1/22 OS   bblocker contraindicated   Good reduction with brim BID,  but developed allergy   IOP remains at goal <21   continue latan qhs OU  Rtc 6 months for DFE/OCT RNFL       diabetes without retinopathy: recommend yearly exams

## 2024-10-22 DIAGNOSIS — Z79.4 TYPE 2 DIABETES MELLITUS WITHOUT COMPLICATION, WITH LONG-TERM CURRENT USE OF INSULIN (MULTI): Primary | ICD-10-CM

## 2024-10-22 DIAGNOSIS — E11.9 TYPE 2 DIABETES MELLITUS WITHOUT COMPLICATION, WITH LONG-TERM CURRENT USE OF INSULIN (MULTI): Primary | ICD-10-CM

## 2024-10-22 DIAGNOSIS — N39.0 URINARY TRACT INFECTION WITHOUT HEMATURIA, SITE UNSPECIFIED: ICD-10-CM

## 2024-10-22 RX ORDER — CEFPODOXIME PROXETIL 100 MG/1
100 TABLET, FILM COATED ORAL 2 TIMES DAILY
Qty: 14 TABLET | Refills: 0 | Status: SHIPPED | OUTPATIENT
Start: 2024-10-22 | End: 2024-10-29

## 2024-10-22 RX ORDER — TIRZEPATIDE 7.5 MG/.5ML
7.5 INJECTION, SOLUTION SUBCUTANEOUS WEEKLY
Qty: 2 ML | Refills: 1 | Status: SHIPPED | OUTPATIENT
Start: 2024-10-22 | End: 2024-12-17

## 2024-10-28 ENCOUNTER — APPOINTMENT (OUTPATIENT)
Dept: PODIATRY | Facility: CLINIC | Age: 79
End: 2024-10-28
Payer: MEDICARE

## 2024-11-30 DIAGNOSIS — E11.9 TYPE 2 DIABETES MELLITUS WITHOUT COMPLICATIONS (MULTI): ICD-10-CM

## 2024-11-30 DIAGNOSIS — J30.9 ALLERGIC RHINITIS, UNSPECIFIED SEASONALITY, UNSPECIFIED TRIGGER: ICD-10-CM

## 2024-12-02 RX ORDER — MONTELUKAST SODIUM 10 MG/1
10 TABLET ORAL DAILY
Qty: 90 TABLET | Refills: 0 | Status: SHIPPED | OUTPATIENT
Start: 2024-12-02

## 2024-12-02 RX ORDER — HUMAN INSULIN 100 [IU]/ML
32 INJECTION, SUSPENSION SUBCUTANEOUS
Qty: 60 ML | Refills: 1 | Status: SHIPPED | OUTPATIENT
Start: 2024-12-02

## 2024-12-03 DIAGNOSIS — I50.9 HEART FAILURE, UNSPECIFIED HF CHRONICITY, UNSPECIFIED HEART FAILURE TYPE: ICD-10-CM

## 2024-12-04 RX ORDER — CARVEDILOL 12.5 MG/1
12.5 TABLET ORAL 2 TIMES DAILY
Qty: 180 TABLET | Refills: 1 | Status: SHIPPED | OUTPATIENT
Start: 2024-12-04

## 2024-12-04 RX ORDER — TORSEMIDE 20 MG/1
20 TABLET ORAL DAILY
Qty: 90 TABLET | Refills: 1 | Status: SHIPPED | OUTPATIENT
Start: 2024-12-04

## 2024-12-06 ENCOUNTER — PATIENT MESSAGE (OUTPATIENT)
Dept: PULMONOLOGY | Facility: CLINIC | Age: 79
End: 2024-12-06
Payer: MEDICARE

## 2024-12-09 ENCOUNTER — TELEPHONE (OUTPATIENT)
Dept: PULMONOLOGY | Facility: HOSPITAL | Age: 79
End: 2024-12-09
Payer: MEDICARE

## 2024-12-09 DIAGNOSIS — K51.00 ULCERATIVE PANCOLITIS WITHOUT COMPLICATION (MULTI): Primary | ICD-10-CM

## 2024-12-09 DIAGNOSIS — J84.9 INTERSTITIAL LUNG DISEASE (MULTI): ICD-10-CM

## 2024-12-09 RX ORDER — BALSALAZIDE DISODIUM 750 MG/1
1500 CAPSULE ORAL 3 TIMES DAILY
Qty: 540 CAPSULE | Refills: 3 | Status: SHIPPED | OUTPATIENT
Start: 2024-12-09 | End: 2025-12-09

## 2024-12-12 RX ORDER — ALBUTEROL SULFATE 0.83 MG/ML
2.5 SOLUTION RESPIRATORY (INHALATION) EVERY 4 HOURS PRN
Qty: 360 ML | Refills: 5 | Status: SHIPPED | OUTPATIENT
Start: 2024-12-12

## 2024-12-12 NOTE — TELEPHONE ENCOUNTER
Pt reached out needing a refill on her albuterol nebulizer solution. Refill order placed and routed to Dr. Jose to sign. Pt updated via [employer] message.

## 2024-12-20 DIAGNOSIS — Z79.4 TYPE 2 DIABETES MELLITUS WITHOUT COMPLICATION, WITH LONG-TERM CURRENT USE OF INSULIN (MULTI): ICD-10-CM

## 2024-12-20 DIAGNOSIS — E11.9 TYPE 2 DIABETES MELLITUS WITHOUT COMPLICATION, WITH LONG-TERM CURRENT USE OF INSULIN (MULTI): ICD-10-CM

## 2024-12-20 RX ORDER — TIRZEPATIDE 7.5 MG/.5ML
7.5 INJECTION, SOLUTION SUBCUTANEOUS WEEKLY
Qty: 2 ML | Refills: 0 | Status: SHIPPED | OUTPATIENT
Start: 2024-12-20 | End: 2025-01-17

## 2024-12-23 DIAGNOSIS — J84.9 INTERSTITIAL LUNG DISEASE (MULTI): ICD-10-CM

## 2024-12-24 DIAGNOSIS — J44.89 ASTHMA-COPD OVERLAP SYNDROME (MULTI): ICD-10-CM

## 2024-12-24 DIAGNOSIS — J84.9 INTERSTITIAL LUNG DISEASE (MULTI): ICD-10-CM

## 2024-12-24 DIAGNOSIS — J44.9 CHRONIC OBSTRUCTIVE PULMONARY DISEASE, UNSPECIFIED COPD TYPE (MULTI): ICD-10-CM

## 2024-12-24 RX ORDER — ALBUTEROL SULFATE 0.83 MG/ML
2.5 SOLUTION RESPIRATORY (INHALATION) EVERY 4 HOURS PRN
Qty: 360 ML | Refills: 5 | Status: SHIPPED | OUTPATIENT
Start: 2024-12-24

## 2025-01-03 RX ORDER — ALBUTEROL SULFATE 0.83 MG/ML
2.5 SOLUTION RESPIRATORY (INHALATION) EVERY 4 HOURS PRN
Qty: 540 ML | Refills: 5 | Status: SHIPPED | OUTPATIENT
Start: 2025-01-03

## 2025-01-03 NOTE — TELEPHONE ENCOUNTER
Pt called stating she hasn't received her albuterol nebulizer solution yet. This nurse called the pharmacy. They ran it under Medicare part D, which stated that it needed a prior authorization. However, this medication needs to be ran under Medicare part B. Pharmacy stated that when ran under part B, the diagnosis code for ILD is getting denied. A new order was placed with pt's diagnosis of asthma-copd overlap and routed to Dr. Jose to sign. Pt was updated on this matter and verbalized understanding. She was instructed to reach back out if she has any other issues. She verbalized understanding.

## 2025-01-15 ENCOUNTER — LAB (OUTPATIENT)
Dept: LAB | Facility: LAB | Age: 80
End: 2025-01-15
Payer: MEDICARE

## 2025-01-15 DIAGNOSIS — N39.0 URINARY TRACT INFECTION WITHOUT HEMATURIA, SITE UNSPECIFIED: ICD-10-CM

## 2025-01-15 LAB
APPEARANCE UR: CLEAR
BILIRUB UR STRIP.AUTO-MCNC: NEGATIVE MG/DL
COLOR UR: COLORLESS
GLUCOSE UR STRIP.AUTO-MCNC: ABNORMAL MG/DL
KETONES UR STRIP.AUTO-MCNC: NEGATIVE MG/DL
LEUKOCYTE ESTERASE UR QL STRIP.AUTO: ABNORMAL
NITRITE UR QL STRIP.AUTO: NEGATIVE
PH UR STRIP.AUTO: 6 [PH]
PROT UR STRIP.AUTO-MCNC: NEGATIVE MG/DL
RBC # UR STRIP.AUTO: NEGATIVE /UL
RBC #/AREA URNS AUTO: NORMAL /HPF
SP GR UR STRIP.AUTO: 1.01
SQUAMOUS #/AREA URNS AUTO: NORMAL /HPF
UROBILINOGEN UR STRIP.AUTO-MCNC: NORMAL MG/DL
WBC #/AREA URNS AUTO: NORMAL /HPF

## 2025-01-15 PROCEDURE — 87086 URINE CULTURE/COLONY COUNT: CPT

## 2025-01-15 PROCEDURE — 81001 URINALYSIS AUTO W/SCOPE: CPT

## 2025-01-16 DIAGNOSIS — Z79.4 TYPE 2 DIABETES MELLITUS WITHOUT COMPLICATION, WITH LONG-TERM CURRENT USE OF INSULIN (MULTI): ICD-10-CM

## 2025-01-16 DIAGNOSIS — E11.9 TYPE 2 DIABETES MELLITUS WITHOUT COMPLICATION, WITH LONG-TERM CURRENT USE OF INSULIN (MULTI): ICD-10-CM

## 2025-01-16 LAB
BACTERIA UR CULT: NORMAL
HOLD SPECIMEN: NORMAL

## 2025-01-20 DIAGNOSIS — N39.0 URINARY TRACT INFECTION WITHOUT HEMATURIA, SITE UNSPECIFIED: Primary | ICD-10-CM

## 2025-01-20 RX ORDER — CEFPODOXIME PROXETIL 100 MG/1
100 TABLET, FILM COATED ORAL 2 TIMES DAILY
Qty: 20 TABLET | Refills: 0 | Status: SHIPPED | OUTPATIENT
Start: 2025-01-20 | End: 2025-01-30

## 2025-01-27 DIAGNOSIS — E11.9 TYPE 2 DIABETES MELLITUS WITHOUT COMPLICATIONS (MULTI): ICD-10-CM

## 2025-01-27 RX ORDER — TIRZEPATIDE 7.5 MG/.5ML
7.5 INJECTION, SOLUTION SUBCUTANEOUS WEEKLY
OUTPATIENT
Start: 2025-01-27 | End: 2025-02-24

## 2025-01-28 DIAGNOSIS — E11.9 TYPE 2 DIABETES MELLITUS WITHOUT COMPLICATION, WITH LONG-TERM CURRENT USE OF INSULIN (MULTI): Primary | ICD-10-CM

## 2025-01-28 DIAGNOSIS — Z79.4 TYPE 2 DIABETES MELLITUS WITHOUT COMPLICATION, WITH LONG-TERM CURRENT USE OF INSULIN (MULTI): Primary | ICD-10-CM

## 2025-01-28 RX ORDER — TIRZEPATIDE 7.5 MG/.5ML
7.5 INJECTION, SOLUTION SUBCUTANEOUS WEEKLY
Qty: 2 ML | Refills: 3 | Status: SHIPPED | OUTPATIENT
Start: 2025-01-28 | End: 2025-05-20

## 2025-01-28 RX ORDER — CLOPIDOGREL BISULFATE 75 MG/1
75 TABLET ORAL DAILY
Qty: 90 TABLET | Refills: 1 | Status: SHIPPED | OUTPATIENT
Start: 2025-01-28

## 2025-01-29 DIAGNOSIS — E11.9 TYPE 2 DIABETES MELLITUS WITHOUT COMPLICATION, WITHOUT LONG-TERM CURRENT USE OF INSULIN (MULTI): ICD-10-CM

## 2025-01-31 DIAGNOSIS — E11.9 TYPE 2 DIABETES MELLITUS WITHOUT COMPLICATION, WITHOUT LONG-TERM CURRENT USE OF INSULIN (MULTI): ICD-10-CM

## 2025-02-03 ENCOUNTER — APPOINTMENT (OUTPATIENT)
Dept: PODIATRY | Facility: CLINIC | Age: 80
End: 2025-02-03
Payer: MEDICARE

## 2025-02-03 DIAGNOSIS — M19.072 ARTHRITIS OF BOTH MIDFEET: ICD-10-CM

## 2025-02-03 DIAGNOSIS — M79.671 FOOT PAIN, BILATERAL: Primary | ICD-10-CM

## 2025-02-03 DIAGNOSIS — M19.071 ARTHRITIS OF BOTH MIDFEET: ICD-10-CM

## 2025-02-03 DIAGNOSIS — M79.674 PAIN IN TOES OF BOTH FEET: ICD-10-CM

## 2025-02-03 DIAGNOSIS — M79.672 FOOT PAIN, BILATERAL: Primary | ICD-10-CM

## 2025-02-03 DIAGNOSIS — B35.3 TINEA PEDIS OF BOTH FEET: ICD-10-CM

## 2025-02-03 DIAGNOSIS — M79.675 PAIN IN TOES OF BOTH FEET: ICD-10-CM

## 2025-02-03 DIAGNOSIS — E11.9 DM TYPE 2 WITHOUT RETINOPATHY (MULTI): ICD-10-CM

## 2025-02-03 DIAGNOSIS — G62.9 NEUROPATHY: ICD-10-CM

## 2025-02-03 PROCEDURE — 1160F RVW MEDS BY RX/DR IN RCRD: CPT | Performed by: PODIATRIST

## 2025-02-03 PROCEDURE — 1159F MED LIST DOCD IN RCRD: CPT | Performed by: PODIATRIST

## 2025-02-03 PROCEDURE — 1036F TOBACCO NON-USER: CPT | Performed by: PODIATRIST

## 2025-02-03 PROCEDURE — 99213 OFFICE O/P EST LOW 20 MIN: CPT | Performed by: PODIATRIST

## 2025-02-03 NOTE — PROGRESS NOTES
History of Present Illness:   Patient states they are here for nails as patient states she is unable to safely trim on her own  Patient also states the cream she has cleared up her prior rash, she uses as needed.   States she has NTB to feet, it does not wake her at night.   Most recent A1C 8.0, July 2024    Past Medical History  Past Medical History:   Diagnosis Date    Combined forms of age-related cataract, left eye 07/22/2019    Combined form of age-related cataract, left eye    Combined forms of age-related cataract, right eye 08/30/2019    Combined form of age-related cataract, right eye    Lesion of ulnar nerve, unspecified upper limb     Ulnar neuropathy    Personal history of colonic polyps     History of colonic polyps    Transient cerebral ischemic attack, unspecified     TIA (transient ischemic attack)    Unspecified acute conjunctivitis, bilateral 01/27/2020    Acute conjunctivitis of both eyes    Unspecified cataract     Cataracts, both eyes    Viral conjunctivitis, unspecified 11/08/2019    Acute viral conjunctivitis of right eye       Medications and Allergies have been reviewed.    Review Of Systems:  GENERAL: No weight loss, malaise or fevers.  HEENT: Negative for frequent or significant headaches,   RESPIRATORY: Negative for cough, wheezing or shortness of breath.  CARDIOVASCULAR: Negative for chest pain, leg swelling or palpitations.    Examination of Both Lower Extremities:   Objective:   Vasc: DP and PT pulses are palpable bilateral.  CFT is less than 3 seconds bilateral.  Skin temperature is warm to cool proximal to distal bilateral.      Neuro: Vibratory, light touch and proprioception are intact bilateral.     Derm: Nails 1-5 bilateral are elongated.  Skin is supple with normal texture and turgor noted.  Webspaces are clean, dry and intact bilateral.      Ortho: Muscle strength is 5/5 for all pedal groups tested.  Ankle joint, subtalar joint, 1st MPJ and lesser MPJ ROM is full and without  pain or crepitus.  Pain to midfoot ROM. No edema, erythema ecchymosis     1. Tinea pedis of both feet  clotrimazole-betamethasone (Lotrisone) cream      2. Foot pain, bilateral        3. Neuropathy        4. Onychomycosis        5. Arthritis, midfoot          Patient exam and eval  Dm foot health discussed  Use topical antifungal prn  Discussed stiff shoes  Shoes that do not twist or bend  Fu in 4 mos if no noted improvement  A1C 8.0 - July 2024  Try and Keep sugars under 7  Nails debrided  Fu prn  All questions answered        Melida Vega DPM  [phone]  [facility]  Fax: [phone]

## 2025-02-10 ENCOUNTER — APPOINTMENT (OUTPATIENT)
Dept: PRIMARY CARE | Facility: CLINIC | Age: 80
End: 2025-02-10
Payer: MEDICARE

## 2025-02-12 ENCOUNTER — OFFICE VISIT (OUTPATIENT)
Dept: PRIMARY CARE | Facility: CLINIC | Age: 80
End: 2025-02-12
Payer: MEDICARE

## 2025-02-12 VITALS
SYSTOLIC BLOOD PRESSURE: 135 MMHG | HEART RATE: 83 BPM | WEIGHT: 217 LBS | DIASTOLIC BLOOD PRESSURE: 74 MMHG | BODY MASS INDEX: 41 KG/M2 | OXYGEN SATURATION: 95 %

## 2025-02-12 DIAGNOSIS — E11.9 TYPE 2 DIABETES MELLITUS WITHOUT COMPLICATION, WITH LONG-TERM CURRENT USE OF INSULIN (MULTI): Primary | ICD-10-CM

## 2025-02-12 DIAGNOSIS — Z79.4 TYPE 2 DIABETES MELLITUS WITHOUT COMPLICATION, WITH LONG-TERM CURRENT USE OF INSULIN (MULTI): Primary | ICD-10-CM

## 2025-02-12 DIAGNOSIS — J30.9 ALLERGIC RHINITIS, UNSPECIFIED SEASONALITY, UNSPECIFIED TRIGGER: ICD-10-CM

## 2025-02-12 PROCEDURE — 99214 OFFICE O/P EST MOD 30 MIN: CPT | Performed by: STUDENT IN AN ORGANIZED HEALTH CARE EDUCATION/TRAINING PROGRAM

## 2025-02-12 PROCEDURE — 3078F DIAST BP <80 MM HG: CPT | Performed by: STUDENT IN AN ORGANIZED HEALTH CARE EDUCATION/TRAINING PROGRAM

## 2025-02-12 PROCEDURE — 3075F SYST BP GE 130 - 139MM HG: CPT | Performed by: STUDENT IN AN ORGANIZED HEALTH CARE EDUCATION/TRAINING PROGRAM

## 2025-02-12 RX ORDER — PEN NEEDLE, DIABETIC 29 G X1/2"
1 NEEDLE, DISPOSABLE MISCELLANEOUS 2 TIMES DAILY
Qty: 180 EACH | Refills: 3 | Status: SHIPPED | OUTPATIENT
Start: 2025-02-12 | End: 2026-02-07

## 2025-02-12 RX ORDER — MONTELUKAST SODIUM 10 MG/1
10 TABLET ORAL DAILY
Qty: 90 TABLET | Refills: 2 | Status: SHIPPED | OUTPATIENT
Start: 2025-02-12

## 2025-02-12 RX ORDER — INSULIN HUMAN 100 [IU]/ML
32 INJECTION, SUSPENSION SUBCUTANEOUS
Qty: 57.6 ML | Refills: 2 | Status: SHIPPED | OUTPATIENT
Start: 2025-02-12 | End: 2025-11-09

## 2025-02-12 NOTE — PROGRESS NOTES
Subjective   Patient ID: Katarina Hernandez is a 79 y.o. female who presents for Follow-up.  HPI  #1 hypertension on carvedilol 12.5 twice daily and lisinopril 5 mg  2.  HPF-as above and torsemide  3.  COPD/interstitial lung disease on continuous oxygen, Advair and Incruse Ellipta.  4.  GERD/ulcerative colitis-on pantoprazole-chronic use balsalazide following up with gastroenterology  5.  Hyperlipidemia on atorvastatin 40  6.  On Plavix for coronary artery disease  7.  Obstructive sleep apnea on PAP  8.  Diabetes mellitus   Jardiance 10  novolin 42/47 BID -> reduced to 32/32, fewer hypoglycemic episodes  Mounjaro       Check labs   Based on labs see if we 10mounjaro or 15 m based on labs   Will go upto 10 mg and ramp up to 15 if A1c is >8  Pharm Di f high         eczema  Past Medical History:   Diagnosis Date    Combined forms of age-related cataract, left eye 07/22/2019    Combined form of age-related cataract, left eye    Combined forms of age-related cataract, right eye 08/30/2019    Combined form of age-related cataract, right eye    Lesion of ulnar nerve, unspecified upper limb     Ulnar neuropathy    Personal history of colonic polyps     History of colonic polyps    Transient cerebral ischemic attack, unspecified     TIA (transient ischemic attack)    Unspecified acute conjunctivitis, bilateral 01/27/2020    Acute conjunctivitis of both eyes    Unspecified cataract     Cataracts, both eyes    Viral conjunctivitis, unspecified 11/08/2019    Acute viral conjunctivitis of right eye      Past Surgical History:   Procedure Laterality Date    MR HEAD ANGIO WO IV CONTRAST  1/19/2019    MR HEAD ANGIO WO IV CONTRAST 1/19/2019 [facility]    MR NECK ANGIO WO IV CONTRAST  1/19/2019    MR NECK ANGIO WO IV CONTRAST 1/19/2019 [facility]    OTHER SURGICAL HISTORY  01/28/2019    Appendectomy    OTHER SURGICAL HISTORY  01/14/2020    Colonoscopy    OTHER SURGICAL HISTORY  01/14/2020     Esophagogastroduodenoscopy    OTHER SURGICAL HISTORY  09/09/2019    Cataract surgery      Family History   Problem Relation Name Age of Onset    Other (CVA) Mother      Coronary artery disease Mother      Hypertension Mother      Glaucoma Father      Hypertension Father      Multiple myeloma Sister      Diabetes Brother      Hypertension Brother      Diabetes Daughter      Macular degeneration Mother's Sister      Diabetes Father's Sister      Diabetes Father's Brother        Allergies   Allergen Reactions    Ciprofloxacin GI Upset and Nausea Only    Clindamycin Nausea And Vomiting    Penicillins Rash and Hives    Fluocinolone Unknown    Latex Hives    Quinolones Nausea And Vomiting and Nausea/vomiting    Streptomycin Unknown    Cimetidine Nausea And Vomiting    Erythromycin Nausea Only and Rash    Sulfa (Sulfonamide Antibiotics) Nausea And Vomiting and Unknown          Occupation:     Review of Systems    Objective   Visit Vitals  /74   Pulse 83   Wt 98.4 kg (217 lb)   SpO2 95%   BMI 41.00 kg/m²   OB Status Postmenopausal   Smoking Status Never   BSA 2.06 m²      Physical Exam    Assessment/Plan   {Assess/PlanSmartLinks:06470}        Conjunctivae normal.      Pupils: Pupils are equal, round, and reactive to light.   Cardiovascular:      Rate and Rhythm: Normal rate and regular rhythm.      Pulses: Normal pulses.      Heart sounds: Normal heart sounds.   Pulmonary:      Effort: Pulmonary effort is normal.      Breath sounds: Normal breath sounds.   Musculoskeletal:      Cervical back: Neck supple.   Skin:     General: Skin is warm.   Neurological:      General: No focal deficit present.      Mental Status: She is alert and oriented to person, place, and time.   Psychiatric:         Mood and Affect: Mood normal.         Behavior: Behavior normal.         Thought Content: Thought content normal.         Judgment: Judgment normal.         Assessment/Plan   Diagnoses and all orders for this visit:  Type 2 diabetes mellitus without complication, with long-term current use of insulin (Multi)  -     Hemoglobin A1C; Future  -     Albumin-Creatinine Ratio, Urine Random; Future  -     CBC  -     Lipid Panel; Future  -     Comprehensive Metabolic Panel; Future  -     TSH with reflex to Free T4 if abnormal; Future  -     insulin NPH, Isophane, (HumuLIN N NPH U-100 Insulin) 100 unit/mL injection; Inject 32 Units under the skin 2 times a day before meals.  -     BD Insulin Syringe Ultra-Fine 1 mL 31 gauge x 5/16 syringe; Inject 1 each under the skin 2 times a day.  Allergic rhinitis, unspecified seasonality, unspecified trigger  -     montelukast (Singulair) 10 mg tablet; Take 1 tablet (10 mg) by mouth once daily.

## 2025-02-14 DIAGNOSIS — E78.5 HYPERLIPIDEMIA, UNSPECIFIED HYPERLIPIDEMIA TYPE: ICD-10-CM

## 2025-02-15 LAB
ALBUMIN SERPL-MCNC: 4.1 G/DL (ref 3.6–5.1)
ALBUMIN/CREAT UR: 8 MG/G CREAT
ALP SERPL-CCNC: 71 U/L (ref 37–153)
ALT SERPL-CCNC: 13 U/L (ref 6–29)
ANION GAP SERPL CALCULATED.4IONS-SCNC: 7 MMOL/L (CALC) (ref 7–17)
AST SERPL-CCNC: 16 U/L (ref 10–35)
BILIRUB SERPL-MCNC: 0.6 MG/DL (ref 0.2–1.2)
BUN SERPL-MCNC: 27 MG/DL (ref 7–25)
CALCIUM SERPL-MCNC: 9.3 MG/DL (ref 8.6–10.4)
CHLORIDE SERPL-SCNC: 102 MMOL/L (ref 98–110)
CHOLEST SERPL-MCNC: 129 MG/DL
CHOLEST/HDLC SERPL: 1.9 (CALC)
CO2 SERPL-SCNC: 33 MMOL/L (ref 20–32)
CREAT SERPL-MCNC: 0.8 MG/DL (ref 0.6–0.95)
CREAT UR-MCNC: 88 MG/DL (ref 20–275)
EGFRCR SERPLBLD CKD-EPI 2021: 74 ML/MIN/1.73M2
ERYTHROCYTE [DISTWIDTH] IN BLOOD BY AUTOMATED COUNT: 13.2 % (ref 11–15)
EST. AVERAGE GLUCOSE BLD GHB EST-MCNC: 146 MG/DL
EST. AVERAGE GLUCOSE BLD GHB EST-SCNC: 8.1 MMOL/L
GLUCOSE SERPL-MCNC: 89 MG/DL (ref 65–99)
HBA1C MFR BLD: 6.7 % OF TOTAL HGB
HCT VFR BLD AUTO: 42.9 % (ref 35–45)
HDLC SERPL-MCNC: 67 MG/DL
HGB BLD-MCNC: 13.3 G/DL (ref 11.7–15.5)
LDLC SERPL CALC-MCNC: 46 MG/DL (CALC)
MCH RBC QN AUTO: 26.7 PG (ref 27–33)
MCHC RBC AUTO-ENTMCNC: 31 G/DL (ref 32–36)
MCV RBC AUTO: 86.1 FL (ref 80–100)
MICROALBUMIN UR-MCNC: 0.7 MG/DL
NONHDLC SERPL-MCNC: 62 MG/DL (CALC)
PLATELET # BLD AUTO: 199 THOUSAND/UL (ref 140–400)
PMV BLD REES-ECKER: 12.6 FL (ref 7.5–12.5)
POTASSIUM SERPL-SCNC: 4.2 MMOL/L (ref 3.5–5.3)
PROT SERPL-MCNC: 7 G/DL (ref 6.1–8.1)
RBC # BLD AUTO: 4.98 MILLION/UL (ref 3.8–5.1)
SODIUM SERPL-SCNC: 142 MMOL/L (ref 135–146)
TRIGL SERPL-MCNC: 81 MG/DL
TSH SERPL-ACNC: 0.99 MIU/L (ref 0.4–4.5)
WBC # BLD AUTO: 10.4 THOUSAND/UL (ref 3.8–10.8)

## 2025-02-17 RX ORDER — ATORVASTATIN CALCIUM 40 MG/1
40 TABLET, FILM COATED ORAL NIGHTLY
Qty: 90 TABLET | Refills: 2 | Status: SHIPPED | OUTPATIENT
Start: 2025-02-17

## 2025-02-21 ENCOUNTER — APPOINTMENT (OUTPATIENT)
Dept: RESPIRATORY THERAPY | Facility: HOSPITAL | Age: 80
End: 2025-02-21
Payer: MEDICARE

## 2025-02-21 ENCOUNTER — HOSPITAL ENCOUNTER (OUTPATIENT)
Dept: RADIOLOGY | Facility: HOSPITAL | Age: 80
Discharge: HOME | End: 2025-02-21
Payer: MEDICARE

## 2025-02-21 ENCOUNTER — HOSPITAL ENCOUNTER (OUTPATIENT)
Dept: RESPIRATORY THERAPY | Facility: HOSPITAL | Age: 80
Discharge: HOME | End: 2025-02-21
Payer: MEDICARE

## 2025-02-21 ENCOUNTER — OFFICE VISIT (OUTPATIENT)
Dept: ORTHOPEDIC SURGERY | Facility: HOSPITAL | Age: 80
End: 2025-02-21
Payer: MEDICARE

## 2025-02-21 DIAGNOSIS — J44.89 ASTHMA-COPD OVERLAP SYNDROME (MULTI): ICD-10-CM

## 2025-02-21 DIAGNOSIS — M21.062 GENU VALGUM, ACQUIRED, LEFT: ICD-10-CM

## 2025-02-21 DIAGNOSIS — J84.9 INTERSTITIAL LUNG DISEASE (MULTI): ICD-10-CM

## 2025-02-21 DIAGNOSIS — M25.562 ACUTE PAIN OF LEFT KNEE: ICD-10-CM

## 2025-02-21 DIAGNOSIS — M17.12 PRIMARY OSTEOARTHRITIS OF LEFT KNEE: Primary | ICD-10-CM

## 2025-02-21 PROCEDURE — 94618 PULMONARY STRESS TESTING: CPT | Performed by: INTERNAL MEDICINE

## 2025-02-21 PROCEDURE — 1159F MED LIST DOCD IN RCRD: CPT | Performed by: PHYSICIAN ASSISTANT

## 2025-02-21 PROCEDURE — 94729 DIFFUSING CAPACITY: CPT | Performed by: INTERNAL MEDICINE

## 2025-02-21 PROCEDURE — 1125F AMNT PAIN NOTED PAIN PRSNT: CPT | Performed by: PHYSICIAN ASSISTANT

## 2025-02-21 PROCEDURE — 94060 EVALUATION OF WHEEZING: CPT | Performed by: INTERNAL MEDICINE

## 2025-02-21 PROCEDURE — 73564 X-RAY EXAM KNEE 4 OR MORE: CPT | Mod: LT

## 2025-02-21 PROCEDURE — 99204 OFFICE O/P NEW MOD 45 MIN: CPT | Performed by: PHYSICIAN ASSISTANT

## 2025-02-21 PROCEDURE — 94060 EVALUATION OF WHEEZING: CPT

## 2025-02-21 PROCEDURE — 99214 OFFICE O/P EST MOD 30 MIN: CPT | Mod: 25 | Performed by: PHYSICIAN ASSISTANT

## 2025-02-21 ASSESSMENT — PAIN - FUNCTIONAL ASSESSMENT: PAIN_FUNCTIONAL_ASSESSMENT: 0-10

## 2025-02-21 ASSESSMENT — PAIN SCALES - GENERAL: PAINLEVEL_OUTOF10: 8

## 2025-02-21 NOTE — PROGRESS NOTES
History of Present Illness  79 y.o.female  1. Primary osteoarthritis of left knee  Knee Brace, Hinged    Referral to Physical Therapy    CANCELED: Osteoarthritis Knee Brace, Lateral, Adjustable      2. Acute pain of left knee  XR knee left 4+ views      3. Genu valgum, acquired, left  Knee Brace, Hinged    Referral to Physical Therapy    CANCELED: Osteoarthritis Knee Brace, Lateral, Adjustable        Mechanism of injury:   Date of Injury/Pain: 3 weeks ago  Location of pain: left knee  Quality of pain: sharp  Frequency of Pain: worse with walking or bending  Associated symptoms? Swelling.  Previous treatment?     They deny any locking of the knee    27 point review of systems negative except what is stated in HPI     Constitutional Exam: patient's height and weight reviewed, well-kempt  Psychiatric Exam: alert and oriented x 3, appropriate mood and behavior  Eye Exam: FLOR EDMOND  Pulmonary Exam: breathing non-labored, no apparent distress  Lymphatic exam: no appreciable lymphadenopathy in the lower extremities  Cardiovascular exam: DP pulses 2+ bilaterally, PT 2+ bilaterally, toes are pink with good capillary refill, no pitting edema  Skin exam: no open lesions, rashes, abrasions or ulcerations  Neurological exam: sensation to light touch intact in both lower extremities in peripheral and dermatomal distributions (except for any abnormalities noted in musculoskeletal exam)        PHYSICAL EXAM  On examination of the left knee:  Normal gait, neutral alignment  Minimal swelling; No effusion bruising or atrophy.  Neutral alignment.    Normal range of motion in extension and flexion.   Normal strength in flexion and extension.  No extensor lag.    Tenderness to palpation:   No tenderness to palpation over the medial or lateral joint line, tibial plateau, femoral condyles, quadriceps tendon, patellar tendon, patella, MCL or LCL.    Neurovascularly intact.  Normal sensation to light touch.  Popliteal, dorsalis pedis and  posterior tibial pulses 2+ bilaterally.    Negative Vasu's test.   Negative Apley's test.   Negative anterior drawer test.   Negative posterior drawer test.    Negative Lachman's.   Negative valgus stress test at 0 and 30° flexion.   Negative varus stress test at 0 and 30° flexion.   1-1 medial/lateral in 30 degrees flexion, 2-1 medial/lateral at  0 degrees with patellar glide test.   Positive patellar grind test.     DATA/RESULTS  I personally reviewed the patient's x-ray images and reports of the ***. The xrays show no fractures or dislocation.  Normal joint spacing      ASSESSMENT: *** knee patellofemoral syndrome    PLAN: Treatment options were discussed with the patient. The patient was given a prescription for physical therapy.  Physical therapy is medically necessary to improve strength, balance, range of motion and functional outcomes after injury and/or surgery. Patient should avoid deep flexion of the knee including kneeling, squatting or sitting in low chairs.  They should also avoid impact activities such as running and jumping but can use a stationary bike, pool exercises and upper body training. Patient was given a handout and instructed on an at home stretching program.  They should do these exercises 3 times per day for 6 weeks and then daily. Patient can use OTC aspercream for pain and continue to ice and elevate supported at the calf to reduce swelling. All the patient's questions were answered. The patient agrees with the above plan.  Follow up as needed      Follow up as needed

## 2025-02-21 NOTE — PATIENT INSTRUCTIONS
Patient should avoid deep flexion of the knee including kneeling, squatting or sitting in low chairs.  They should also avoid impact activities such as running and jumping but can use a stationary bike, pool exercises and upper body training.    You can use a walker to off load the knee    1. Follow stretching exercises that were on a separate handout   2. Hold each stretch for a least 1 minute  3. Do not bounce while stretching  4. Stretch for 10 minutes at a time, 3x a day for 6 weeks then daily  5. Remember, it takes several weeks to a few months of consistent stretching to increase flexibility and decrease symptoms.     You can use OTC Voltaren gel or aspercream and apply it to the injured area.    Ice and elevate supported at the calf with no pressure on the heel to reduce swelling.    The patient was given a prescription for physical therapy.  Physical therapy is medically necessary to improve strength, balance, range of motion and functional outcomes after injury and/or surgery.    Follow up as needed

## 2025-02-24 DIAGNOSIS — J45.909 ASTHMA, UNSPECIFIED ASTHMA SEVERITY, UNSPECIFIED WHETHER COMPLICATED, UNSPECIFIED WHETHER PERSISTENT (HHS-HCC): ICD-10-CM

## 2025-02-25 RX ORDER — FLUTICASONE PROPIONATE 50 MCG
2 SPRAY, SUSPENSION (ML) NASAL DAILY
Qty: 48 G | Refills: 0 | Status: SHIPPED | OUTPATIENT
Start: 2025-02-25

## 2025-02-26 ENCOUNTER — OFFICE VISIT (OUTPATIENT)
Dept: PULMONOLOGY | Facility: CLINIC | Age: 80
End: 2025-02-26
Payer: MEDICARE

## 2025-02-26 VITALS
HEART RATE: 70 BPM | BODY MASS INDEX: 40.97 KG/M2 | SYSTOLIC BLOOD PRESSURE: 149 MMHG | RESPIRATION RATE: 18 BRPM | HEIGHT: 61 IN | DIASTOLIC BLOOD PRESSURE: 83 MMHG | OXYGEN SATURATION: 95 % | WEIGHT: 217 LBS

## 2025-02-26 DIAGNOSIS — J45.909 ASTHMA, UNSPECIFIED ASTHMA SEVERITY, UNSPECIFIED WHETHER COMPLICATED, UNSPECIFIED WHETHER PERSISTENT (HHS-HCC): ICD-10-CM

## 2025-02-26 DIAGNOSIS — G47.33 OBSTRUCTIVE SLEEP APNEA (ADULT) (PEDIATRIC): ICD-10-CM

## 2025-02-26 DIAGNOSIS — J44.9 CHRONIC OBSTRUCTIVE PULMONARY DISEASE, UNSPECIFIED COPD TYPE (MULTI): Primary | ICD-10-CM

## 2025-02-26 DIAGNOSIS — J44.89 ASTHMA-COPD OVERLAP SYNDROME (MULTI): ICD-10-CM

## 2025-02-26 DIAGNOSIS — J84.9 INTERSTITIAL LUNG DISEASE (MULTI): ICD-10-CM

## 2025-02-26 PROCEDURE — 99214 OFFICE O/P EST MOD 30 MIN: CPT | Performed by: INTERNAL MEDICINE

## 2025-02-26 PROCEDURE — 1036F TOBACCO NON-USER: CPT | Performed by: INTERNAL MEDICINE

## 2025-02-26 PROCEDURE — 3079F DIAST BP 80-89 MM HG: CPT | Performed by: INTERNAL MEDICINE

## 2025-02-26 PROCEDURE — 3077F SYST BP >= 140 MM HG: CPT | Performed by: INTERNAL MEDICINE

## 2025-02-26 RX ORDER — FLUTICASONE PROPIONATE 50 MCG
2 SPRAY, SUSPENSION (ML) NASAL DAILY
Qty: 48 G | Refills: 0 | Status: CANCELLED | OUTPATIENT
Start: 2025-02-26

## 2025-02-26 NOTE — PROGRESS NOTES
"Interval 2/26/2025  She is overall doing well.  No emergency room visit or prednisone burst or hospitalization for COPD.  She continues to be active.  She wears her oxygen at 2 L nasal cannula all the time.  She is compliant with her CPAP.  She completed her pulmonary function test and 6-minute walk test and are for the most part stable and detailed below    CAT 30   MMRC 2 to 3     PE:  /83   Pulse 70   Resp 18   Ht 1.549 m (5' 1\")   Wt 98.4 kg (217 lb)   SpO2 95% Comment: 2L NC  BMI 41.00 kg/m²   Lungs: Diminished breath sounds bilaterally but no wheezing    Interval 8/21/2024  Katarina is doing very well.  She is using the Trelegy daily.  She only gets short of breath when it is hard side and she tries to stay inside in the air conditioner.  No exacerbation since I last saw her.  No weight loss loss of appetite fever chills night sweats.    Cat 17.  MMRC 2    PE:  /61   Pulse 72   Temp 36.3 °C (97.3 °F)   Resp 20   Wt 99.8 kg (220 lb)   SpO2 97% Comment: Using 2 liters of oxygen  BMI 41.57 kg/m²   Lungs: Diminished breath sounds bilaterally but no wheezing    Interim 12/6/2023     Nov 23 she had an episode upper respiratory infection with worsening shortness of breath and cough lending her an emergency room visit.  She was given doxycycline and benzonatate.  She feels a lot better and her cough is clearing up with continued whitish sputum.  She also describes runny nose.  She is using her Trelegy daily.  She feels about 60% better but she still have shortness of breath.  She uses 2 liters with her CPAP and 2 at rest and 3 when walking    PE: /52   Pulse 75   Temp 35.9 °C (96.6 °F)   Ht 1.549 m (5' 1\")   Wt 100 kg (221 lb)   SpO2 100%   BMI 41.76 kg/m²   Lungs: bilateral wheezing      Interim 06 2023   she is more tired than usual. She is compliant with her CPAP and following up with the sleep doctor. She remains active but not as much because of the fatigue and independent of " her activities of daily living.   She is mMRC 3. She uses her oxygen all the time. No fever or chills or night sweats or weight loss or loss of appetite. No sick visits for COPD exacerbation or emergency room visits. No prednisone burst since I last saw her. She does have nocturnal cough but that is not bothering her. Denies reflux and postnasal drip. vaccinated for the flu pneumonia utd   Because of insurance coverage since she switched to the Wixela instead of the Advair and she is currently contemplating getting Spiriva to supplement.     Lungs: diminished BS bilaterally but no wheezes or crackles        interim 12 2 2022   SOB is the same. She is mMRC 3. She continues to be active and independent of her activities of daily living. She uses her oxygen all the time. No fever or chills or night sweats or weight loss or loss of appetite. No sick visits for COPD exacerbation or emergency room visits. No prednisone burst since I last saw her. She does have nocturnal cough but that is not bothering her. Denies reflux and postnasal drip.   vaccinated for the flu   pneumonia utd      Interim 08 2022   I last saw her in April 2022. Since I last saw her she feels stable She is still independent in her activities of daily living. She uses her oxygen all the time except at night. a nocturnal pulse ox while on CPAP showed desaturation. She lives with her  who just suffered a stroke and she helps him. No COPD exacerbations since I last saw her, no prednisone burst and no ER visits for any COPD issues. She denies any fever or chills or night sweats. she lost about 11lbs since last I saw her. She is compliant with her CPAP and her inhalers      CAT score 22   ACT 15     Interim 04 05 2022  I last saw her in December 2021. Since I last saw her she feels stable except for worsening wheezing and perhaps slight worsening of her cough in the last month or so. She is still independent in her activities of daily living. She uses  her oxygen all the time except at night. She lives by herself. No COPD exacerbations since I last saw her, no prednisone burst and no ER visits for any COPD issues. She denies any fever or chills or night sweats or weight loss or loss of appetite. She is compliant with her CPAP.     Initial  74 yo F morbidly obese and past smoker quit in 1989, 20 pack year smoker, CAD, DM2, obesity, AMY, UC, Ashtma-COPD, Chronic respiratory failure 2L exertion since Jan 2020 and sleep and 3L with portable tank when not using CPAP for AMY, Lung nodules, Chronic sinusitis, CAD, HTN, DM2   Since last visit doing OK, able to do most of her ADL;s without assistance. been on oxygen for about 2 years.  No ER visits or hospitalizations in the last 6 months to one year. uses her inhalers regularly. the incruseuse ellipta helped a lot. Have not done her stationary bike in the last month or so. she has some cough not bothersome. no fever or chills or [place] or weight loss. not on chronic prednisone but goes on prednisone couple of times but none since last year      Inhalers:  Advair 500/50 mcg 1 puff twice a day and rinse after  Albuterol MDI  Incruse 1 time a day   Albuterol neb broken- will give Rx         Social Hx: past smoker as outlined above, used to work for the [employer] and [employer], retired 2007  No birds, hot tub, molds, toxic exposures- in one of the buildings there was a problem with asbestos- some people she worked with are having lung issues from asbestos- exposed [phone] while working at the [employer].     Fhx: CAD, no known lung disease, no ILDs     Surgical Hx: Appendectomy, C section     PMHx: UC, Ashtma-COPD, Chronic respiratory failure 2L exertion and sleep and 3L with portable tank when not using CPAP, Lung nodules, Chronic sinusitis, CAD, HTN, DM2         PE: 12 2022  VS noted   CTA mild crackles at the bases         Diag data   Per prior documentation- PFT back in a  February 2019 showed moderate obstructive ventilatory defect with increase post bronchodilator response by 20%  PFTs I have personally visualized the most recent pulmonary function testing results.     Date  FEV1/FVC FVC L(%) FEV1 L(%) TLC L(%) RV/TLC DLCO c( %)  02/21/2025  63    1.44--1.59 (10%)  0.91--1          13   [id number] 63  1.55  0.98  06 2023 62, 1.54, .96 ; 9.8 (54%)  08/2022 .64, 1.67 (84%) 1 (72%), 11.5 (63%)  11/2021 0.6 1.47(74%) 0.9(56%) 128% 53% 10 (55%)   2/2019 1.48 0.97 (post BD, 20% BD response) 12     Six mn walk test   02/2025 2LNC 224 m   06 2023; 3 LNC 99%-99% walked about 226m   11 2021 3LNC, 182 m ; 100-99%      Chest CT multiple dating back to 2019 show stable fibrotic changes in an upper and mild lung zones predominance in a pattern suggestive of HP and indeterminant for UI P  CHest CT July stable findings compared to last year   CHest CT from May 2023 stable compared to June 2021     SABA, RF CCP none  Biopsy : none   Hypersensitivity panel neg   RF slightly up      TTE July 2022    Left Ventricle: The left ventricular systolic function is normal, with an estimated ejection fraction of 65%. There are no regional wall motion abnormalities. The left ventricular cavity size is normal. Spectral Doppler shows a normal pattern of left ventricular diastolic filling.  Left Atrium: The left atrium is normal in size.  Right Ventricle: The right ventricle is normal in size. There is normal right ventricular global systolic function.  Right Atrium: The right atrium is normal in size.  Aortic Valve: The aortic valve is trileaflet. There is mild aortic valve cusp calcification. There is trace to mild aortic valve regurgitation. The peak instantaneous gradient of the aortic valve is 9.2 mmHg.  Mitral Valve: The mitral valve is normal in structure. There is no evidence of mitral valve regurgitation.  Tricuspid Valve: The tricuspid valve is structurally normal. No evidence of tricuspid  regurgitation.  Pulmonic Valve: The pulmonic valve is structurally normal. There is no indication of pulmonic valve regurgitation.  Pericardium: There is no pericardial effusion noted.  Aorta: The aortic root is normal. There is no dilatation of the ascending aorta.  Systemic Veins: The inferior vena cava appears to be of normal size. There is IVC inspiratory collapse greater than 50%.         Provider Impressions and Plan:     This is a 79-year-old female prior smoker but who quit more than 20 years ago with:     1.COPD/Asthma overlap; Moderate obstructive lung disease on pulmonary function test dating back to at least 2019 and that is not any worse over the last few years. She previously had a 20% bronchodilator response and 10% on most recent one from 02/20245. She is responding well to LABA, LAMA and ICS therapy. She does have 1-2 COPD exacerbation per year Not requiring hospitalization, last Nov 2023.  Group B   -trelegy   -Utd on vaccines   -Pulm rehab referral   -consider ensifentrine       2.Diffuse parenchymal lung disease on a chest CT dating back to at least 2019. Per chart review this was also present when she was followed up at [facility] or 6 years ago. She never had a biopsy. The pattern is indeterminate for UIP and suggestive of hypersensitivity pneumonitis. No exposures whatsoever. HP panel neg.  stable clinically and by testing from  02/2025 HRCT 05/2023 stable --> continue watching      3. Morbid obesity BMI 41        4.AMY  - on CPAP and following with sleep -nocturnal Oximetry on CPAP showed desaturation and we will prescribe O2 to be blended      5. Hypoxemic resp failure   In the setting of #1 and #2  Needs updated 6 mn walk testing before next visit     RTC in 6 months w testing

## 2025-02-26 NOTE — PATIENT INSTRUCTIONS
Dear Katarina Hernandez It was nice seeing you today. We discussed the following:     You are doing good!   Robinson terrazas   Made referral to pulmonary rehab   Consider adding ohtuvayre nebulizer   I will see you back in follow-up in 6 months    For scheduling purposes:    Call [phone] to schedule a breathing or a walking test   Should you have any questions Please Call my assistant Marylu Ford at [phone] or our pulmonary nurse Saloni Walter [phone].

## 2025-02-27 DIAGNOSIS — Z79.4 TYPE 2 DIABETES MELLITUS WITHOUT COMPLICATION, WITH LONG-TERM CURRENT USE OF INSULIN (MULTI): Primary | ICD-10-CM

## 2025-02-27 DIAGNOSIS — E11.9 TYPE 2 DIABETES MELLITUS WITHOUT COMPLICATION, WITH LONG-TERM CURRENT USE OF INSULIN (MULTI): Primary | ICD-10-CM

## 2025-02-27 RX ORDER — TIRZEPATIDE 10 MG/.5ML
10 INJECTION, SOLUTION SUBCUTANEOUS WEEKLY
Qty: 2 ML | Refills: 2 | Status: SHIPPED | OUTPATIENT
Start: 2025-02-27

## 2025-03-04 ASSESSMENT — ENCOUNTER SYMPTOMS
HEMATURIA: 0
ABDOMINAL PAIN: 0
DIZZINESS: 0
NAUSEA: 0
CONSTIPATION: 0
NUMBNESS: 0
WEAKNESS: 0
FEVER: 0
ACTIVITY CHANGE: 0
SPEECH DIFFICULTY: 0
SHORTNESS OF BREATH: 0
DYSURIA: 0
VOMITING: 0
COUGH: 0
WHEEZING: 0

## 2025-03-13 ENCOUNTER — OFFICE VISIT (OUTPATIENT)
Dept: ORTHOPEDIC SURGERY | Facility: HOSPITAL | Age: 80
End: 2025-03-13
Payer: MEDICARE

## 2025-03-13 ENCOUNTER — HOSPITAL ENCOUNTER (OUTPATIENT)
Dept: RADIOLOGY | Facility: HOSPITAL | Age: 80
Discharge: HOME | End: 2025-03-13
Payer: MEDICARE

## 2025-03-13 DIAGNOSIS — M76.62 LEFT ACHILLES TENDINITIS: Primary | ICD-10-CM

## 2025-03-13 DIAGNOSIS — M25.572 LEFT ANKLE PAIN, UNSPECIFIED CHRONICITY: ICD-10-CM

## 2025-03-13 PROCEDURE — 1159F MED LIST DOCD IN RCRD: CPT

## 2025-03-13 PROCEDURE — 99214 OFFICE O/P EST MOD 30 MIN: CPT

## 2025-03-13 PROCEDURE — 1160F RVW MEDS BY RX/DR IN RCRD: CPT

## 2025-03-13 PROCEDURE — 1036F TOBACCO NON-USER: CPT

## 2025-03-13 PROCEDURE — L4361 PNEUMA/VAC WALK BOOT PRE OTS: HCPCS

## 2025-03-13 PROCEDURE — 73610 X-RAY EXAM OF ANKLE: CPT | Mod: LT

## 2025-03-13 NOTE — PROGRESS NOTES
PRIMARY CARE PHYSICIAN: Shayy Contreras MD  REFERRING PROVIDER: No referring provider defined for this encounter.     CONSULT ORTHOPAEDIC: Ankle Evaluation    ASSESSMENT & PLAN    Impression: 79 y.o. female with acute onset of left Achilles tendinitis    Plan:   I explained to the patient the nature of their diagnosis.  I reviewed their imaging studies with them.    Based on the history, physical exam and imaging studies above, the patient's presentation is consistent with the above diagnosis.  I had a long discussion with the patient regarding their presentation and the treatment options.  We discussed initial nonoperative versus operative management options as well as potential further diagnostic imaging.  I reviewed the x-ray images with the patient which show no acute osseous abnormality.  Patient has insidious onset of left distal Achilles pain without a mechanism of injury or trauma.  Her Achilles is palpable and intact on exam today, Sykes test negative.  No concern for infectious process at this time.  We discussed the likely above diagnosis and conservative treatment options for this.  Patient was offered soft tissue rest with immobilization in a cam walking boot which she elected to proceed with.  She may be weightbearing as tolerated in the boot and will come out to perform gentle ankle range of motion exercises ice and elevate the left ankle.  She will continue with Tylenol as needed and try Voltaren gel.  She may return to a well supportive shoe once her pain improves.  We discussed that should her pain not improve with the above treatment plan she will follow-up with foot/ankle specialist for further evaluation and discussion of treatment options.  Follow-up appointment was made for her today.  All questions were answered and patient was in agreement with the above plan.    At the end of the visit, all questions were answered in full. The patient is in agreement with the plan and  recommendations. They will call the office with any questions/concerns.    Note dictated with [employer] software. Completed without full typed error editing and sent to avoid delay.     SUBJECTIVE  CHIEF COMPLAINT:   Chief Complaint   Patient presents with    Left Ankle - Pain        HPI: Katarina Hernandez is a 79 y.o. patient who presents today to the [facility] with left ankle pain.  Pain began yesterday with no injury or trauma.  She noticed pain localized to the posterior ankle/distal Achilles tendon last night.  When she awoke in the middle the night and attempted to walk to the bathroom her pain was worse.  She is unsure what may have caused this.  No recent change in activity or injury to the left ankle.  No history of left ankle injuries.  Pain is worse with walking and palpation.  Patient reports an extensive medical history and only takes Tylenol for pain.  No swelling, redness/warmth or fever/chills.    They deny any constant or progressive numbness or tingling in their legs.     REVIEW OF SYSTEMS  Constitutional: See HPI for pain assessment, No significant recent weight gain or loss, no fever or chills  Cardiovascular: No chest pain, shortness of breath  Respiratory: No difficulty breathing, no cough  Gastrointestinal: No nausea, vomiting, diarrhea, constipation  Musculoskeletal: Noted in HPI, positive for pain, restricted motion, stiffness  Integumentary: No rashes, easy bruising or skin lesions  Neurological: No headache, no numbness or tingling in extremities  Psychiatric: No mood changes or memory changes. No social issues  Heme/Lymph: No excessive swelling, easy bruising or excessive bleeding  ENT: No hearing changes, no nosebleeds  Eyes: No vision changes    Past Medical History:   Diagnosis Date    Combined forms of age-related cataract, left eye 07/22/2019    Combined form of age-related cataract, left eye    Combined forms of age-related cataract, right eye 08/30/2019    Combined form  of age-related cataract, right eye    Lesion of ulnar nerve, unspecified upper limb     Ulnar neuropathy    Personal history of colonic polyps     History of colonic polyps    Transient cerebral ischemic attack, unspecified     TIA (transient ischemic attack)    Unspecified acute conjunctivitis, bilateral 01/27/2020    Acute conjunctivitis of both eyes    Unspecified cataract     Cataracts, both eyes    Viral conjunctivitis, unspecified 11/08/2019    Acute viral conjunctivitis of right eye        Allergies   Allergen Reactions    Ciprofloxacin GI Upset and Nausea Only    Clindamycin Nausea And Vomiting    Penicillins Rash and Hives    Fluocinolone Unknown    Latex Hives    Quinolones Nausea And Vomiting and Nausea/vomiting    Streptomycin Unknown    Cimetidine Nausea And Vomiting    Erythromycin Nausea Only and Rash    Sulfa (Sulfonamide Antibiotics) Nausea And Vomiting and Unknown        Past Surgical History:   Procedure Laterality Date    MR HEAD ANGIO WO IV CONTRAST  1/19/2019    MR HEAD ANGIO WO IV CONTRAST 1/19/2019 [facility]    MR NECK ANGIO WO IV CONTRAST  1/19/2019    MR NECK ANGIO WO IV CONTRAST 1/19/2019 [facility]    OTHER SURGICAL HISTORY  01/28/2019    Appendectomy    OTHER SURGICAL HISTORY  01/14/2020    Colonoscopy    OTHER SURGICAL HISTORY  01/14/2020    Esophagogastroduodenoscopy    OTHER SURGICAL HISTORY  09/09/2019    Cataract surgery        Family History   Problem Relation Name Age of Onset    Other (CVA) Mother      Coronary artery disease Mother      Hypertension Mother      Glaucoma Father      Hypertension Father      Multiple myeloma Sister      Diabetes Brother      Hypertension Brother      Diabetes Daughter      Macular degeneration Mother's Sister      Diabetes Father's Sister      Diabetes Father's Brother          Social History     Socioeconomic History    Marital status: Legally      Spouse name: Not on file    Number of children: Not on file    Years  of education: Not on file    Highest education level: Not on file   Occupational History    Not on file   Tobacco Use    Smoking status: Never    Smokeless tobacco: Never   Substance and Sexual Activity    Alcohol use: Not Currently    Drug use: Never    Sexual activity: Not on file   Other Topics Concern    Not on file   Social History Narrative    Not on file     Social Drivers of Health     Financial Resource Strain: Not on file   Food Insecurity: Not on file   Transportation Needs: Not on file   Physical Activity: Not on file   Stress: Not on file   Social Connections: Not on file   Intimate Partner Violence: Not on file   Housing Stability: Not on file        CURRENT MEDICATIONS:   Current Outpatient Medications   Medication Sig Dispense Refill    albuterol 2.5 mg /3 mL (0.083 %) nebulizer solution Take 3 mL (2.5 mg) by nebulization every 4 hours if needed for wheezing or shortness of breath. 540 mL 5    albuterol 90 mcg/actuation inhaler USE 1 TO 2 INHALATIONS EVERY 4 TO 6 HOURS AS NEEDED 18 g 11    atorvastatin (Lipitor) 40 mg tablet TAKE 1 TABLET AT BEDTIME 90 tablet 2    balsalazide (Colazal) 750 mg capsule Take 2 capsules (1,500 mg) by mouth 3 times a day. 540 capsule 3    BD Insulin Syringe Ultra-Fine 1 mL 31 gauge x 5/16 syringe Inject 1 each under the skin 2 times a day. 180 each 3    calcium carbonate-vit D3-min 600 mg calcium- 400 unit tablet Take 1 tablet by mouth once daily.      calcium carbonate-vitamin D3 600 mg-20 mcg (800 unit) tablet Take 1 tablet by mouth once daily.      carvedilol (Coreg) 12.5 mg tablet Take 1 tablet (12.5 mg) by mouth 2 times a day. 180 tablet 1    cholecalciferol (Vitamin D-3) 25 MCG (1000 UT) tablet Take 1 tablet (25 mcg) by mouth once daily.      clopidogrel (Plavix) 75 mg tablet TAKE 1 TABLET BY MOUTH ONCE DAILY. 90 tablet 1    clotrimazole-betamethasone (Lotrisone) cream APPLY TO AFFECTED AREA TWICE A DAY 45 g 3    diclofenac sodium 1 % kit APPLY TO UPPER EXTREMITIES,  2 GM OF GEL TO AFFECTED AREA 4 TIMES DAILY.  DO NOT APPLY MORE THAN 8 GM DAILY TO ANY ONE AFFECTED JOINT.      empagliflozin (Jardiance) 10 mg Take 1 tablet (10 mg) by mouth once daily. 90 tablet 1    fluticasone (Flonase) 50 mcg/actuation nasal spray Administer 2 sprays into each nostril once daily. 48 g 0    fluticasone-umeclidin-vilanter (Trelegy Ellipta) 200-62.5-25 mcg blister with device Inhale 1 puff once daily in the morning. Take before meals. 180 each 3    hydrocortisone (Anusol-HC) 2.5 % rectal cream Insert into the rectum 2 times a day. 28 g 2    insulin NPH, Isophane, (HumuLIN N NPH U-100 Insulin) 100 unit/mL injection Inject 32 Units under the skin 2 times a day before meals. 57.6 mL 2    latanoprost (Xalatan) 0.005 % ophthalmic solution Administer 1 drop into both eyes once daily at bedtime. 7.5 mL 3    lisinopril 5 mg tablet TAKE 1 TABLET BY MOUTH EVERY DAY 90 tablet 3    montelukast (Singulair) 10 mg tablet Take 1 tablet (10 mg) by mouth once daily. 90 tablet 2    multivitamin (MULTIPLE VITAMINS ORAL) Take 1 tablet by mouth once daily.      NovoLIN N NPH U-100 Insulin 100 unit/mL injection INJECT 32 UNITS UNDER THE SKIN 2 TIMES A DAY BEFORE MEALS. 60 mL 1    ONETOUCH DELICA LANCETS MISC 3 times a day.      OneTouch Ultra Test strip 2 times a day.      pantoprazole (ProtoNix) 40 mg EC tablet TAKE 1 TABLET BY MOUTH EVERY DAY IN THE MORNING 90 tablet 3    tirzepatide (Mounjaro) 10 mg/0.5 mL pen injector Inject 10 mg under the skin 1 (one) time per week. 2 mL 2    torsemide (Demadex) 20 mg tablet Take 1 tablet (20 mg) by mouth once daily. 90 tablet 1     Current Facility-Administered Medications   Medication Dose Route Frequency Provider Last Rate Last Admin    perflutren lipid microspheres (Definity) injection 4 mL of dilution  4 mL of dilution intravenous Once Ryan XAVIER MD            OBJECTIVE    PHYSICAL EXAM      4/2/2024     9:50 AM 8/5/2024     1:15 PM 8/8/2024    11:17 AM 8/9/2024    11:46  "AM 8/21/2024    11:19 AM 2/12/2025     1:23 PM 2/26/2025     8:58 AM   Vitals   Systolic  116  125 108 135 149   Diastolic  66  69 61 74 83   Heart Rate  77  72 72 83 70   Temp    36.7 °C (98.1 °F) 36.3 °C (97.3 °F)     Resp     20  18   Height 1.549 m (5' 0.98\") 1.549 m (5' 1\") 1.575 m (5' 2\") 1.549 m (5' 1\")   1.549 m (5' 1\")   Weight (lb) 222.66 222.9 222 220 220 217 217   BMI 42.09 kg/m2 42.12 kg/m2 40.6 kg/m2 41.57 kg/m2 41.57 kg/m2 41 kg/m2 41 kg/m2   BSA (m2) 2.08 m2 2.08 m2 2.1 m2 2.07 m2 2.07 m2 2.06 m2 2.06 m2   Visit Report  Report  Report Report Report Report      There is no height or weight on file to calculate BMI.    GENERAL: A/Ox3, NAD. Appears healthy, well nourished  PSYCHIATRIC: Mood stable, appropriate memory recall  EYES: EOM intact, no scleral icterus  CARDIOVASCULAR: Palpable peripheral pulses  LUNGS: Breathing non-labored on room air  SKIN: No open lesions, rashes, ulcerations     MUSCULOSKELETAL:  Laterality: Left ankle Exam  - Skin intact  - No erythema or warmth  - No ecchymosis or soft tissue swelling  - Alignment: Neutral  - Palpation: Positive tenderness to palpation distal Achilles insertion.  No tenderness to palpation over medial and lateral malleolus, posterior tibial tendon, peroneal tendon, ATFL, deltoid ligament, talus or navicular.   - ROM: Normal ROM in ankle plantar flexion, dorsiflexion, inversion and eversion; normal ROM in 2-5th toe flexion/extension and 1st MTP flexion/extension  - Strength: Normal strength in ankle plantar flexion, dorsiflexion, inversion and eversion; normal strength with great toe flexion/extension  - Stability:        Anterior drawer stable       Inversion/Eversion stable  - Achilles tendon palpable and intact; Sykes test negative  - Gait: Antalgic      NEUROVASCULAR:  - Neurovascular Status: sensation intact to light touch distally, lower extremity motor intact  - Capillary refill brisk at extremities, Bilateral dorsalis pedis pulse 2+    Imaging: " Multiple views of the affected left ankle(s) demonstrate: No acute osseous abnormality, no fracture, ankle mortise well-maintained.   X-rays were personally reviewed and interpreted by me.  Radiology reports were reviewed by me as well, if readily available at the time.

## 2025-03-19 ENCOUNTER — TELEPHONE (OUTPATIENT)
Dept: PRIMARY CARE | Facility: CLINIC | Age: 80
End: 2025-03-19

## 2025-03-20 ENCOUNTER — OFFICE VISIT (OUTPATIENT)
Dept: URGENT CARE | Age: 80
End: 2025-03-20
Payer: MEDICARE

## 2025-03-20 VITALS
OXYGEN SATURATION: 94 % | SYSTOLIC BLOOD PRESSURE: 128 MMHG | DIASTOLIC BLOOD PRESSURE: 71 MMHG | TEMPERATURE: 98.5 F | RESPIRATION RATE: 16 BRPM | HEART RATE: 78 BPM

## 2025-03-20 DIAGNOSIS — E11.69 TYPE 2 DIABETES MELLITUS WITH OTHER SPECIFIED COMPLICATION, WITH LONG-TERM CURRENT USE OF INSULIN: Primary | ICD-10-CM

## 2025-03-20 DIAGNOSIS — Z79.4 TYPE 2 DIABETES MELLITUS WITH OTHER SPECIFIED COMPLICATION, WITH LONG-TERM CURRENT USE OF INSULIN: Primary | ICD-10-CM

## 2025-03-20 DIAGNOSIS — M79.89 SWELLING OF TOE OF RIGHT FOOT: ICD-10-CM

## 2025-03-20 DIAGNOSIS — M79.674 TOE PAIN, RIGHT: ICD-10-CM

## 2025-03-20 RX ORDER — PREDNISONE 20 MG/1
20 TABLET ORAL DAILY
Qty: 5 TABLET | Refills: 0 | Status: SHIPPED | OUTPATIENT
Start: 2025-03-20 | End: 2025-03-25

## 2025-03-20 ASSESSMENT — PAIN SCALES - GENERAL: PAINLEVEL_OUTOF10: 10-WORST PAIN EVER

## 2025-03-20 ASSESSMENT — PATIENT HEALTH QUESTIONNAIRE - PHQ9
1. LITTLE INTEREST OR PLEASURE IN DOING THINGS: NOT AT ALL
2. FEELING DOWN, DEPRESSED OR HOPELESS: NOT AT ALL
SUM OF ALL RESPONSES TO PHQ9 QUESTIONS 1 AND 2: 0

## 2025-03-20 NOTE — PROGRESS NOTES
HPI:  Patient states that for the past 5 days she has pain and swelling in her right big toe and pain radiates up her foot.  No injury to the foot or toe.  Pt denies numbness/weakness in the toe. Pt states that she has not had gout in the past, but thinks that it can be gout.  Pt states that she tried elevating/ice with no relief.       ROS:  No numbness/weakness  No toe injury  No fever/chills    PE:    A&O x3  No conjunctival erythema  Normal respiratory effort,pt on O2 by NC  +swelling/erythema/tndop/warmth over right 1st toe MTP and distal foot  No focal deficit  No vascular deficit in the right toe  Judgement normal    A/P:   Toe pain  Toe swelling  DM    Your symptoms are likely secondary to gout.  Strict diet.  Discuss uric acid level check with your doctor at next visit.  Elevate.  Rest. Alternate ice and warm soak with Epsom salt.  Monitor blood pressure and blood sugar when on steroids.  Keep a diary of symptoms.  Recheck with your doctor in 3 days if no improvement.  Go to the ER if starts getting worse.

## 2025-03-21 DIAGNOSIS — K51.30 ULCERATIVE RECTOSIGMOIDITIS WITHOUT COMPLICATION (MULTI): ICD-10-CM

## 2025-03-22 DIAGNOSIS — K51.30 ULCERATIVE RECTOSIGMOIDITIS WITHOUT COMPLICATION (MULTI): ICD-10-CM

## 2025-03-22 RX ORDER — PREDNISONE 5 MG/1
TABLET ORAL
Qty: 70 TABLET | Refills: 0 | Status: SHIPPED | OUTPATIENT
Start: 2025-03-22

## 2025-03-24 RX ORDER — PREDNISONE 5 MG/1
TABLET ORAL
Qty: 70 TABLET | Refills: 0 | Status: SHIPPED | OUTPATIENT
Start: 2025-03-24

## 2025-03-25 DIAGNOSIS — M10.9 GOUT, UNSPECIFIED CAUSE, UNSPECIFIED CHRONICITY, UNSPECIFIED SITE: Primary | ICD-10-CM

## 2025-03-25 RX ORDER — PREDNISONE 20 MG/1
40 TABLET ORAL DAILY
Qty: 10 TABLET | Refills: 0 | Status: SHIPPED | OUTPATIENT
Start: 2025-03-25 | End: 2025-03-30

## 2025-03-31 ENCOUNTER — APPOINTMENT (OUTPATIENT)
Dept: ORTHOPEDIC SURGERY | Facility: HOSPITAL | Age: 80
End: 2025-03-31
Payer: MEDICARE

## 2025-04-02 ENCOUNTER — APPOINTMENT (OUTPATIENT)
Dept: PHYSICAL THERAPY | Facility: CLINIC | Age: 80
End: 2025-04-02
Payer: MEDICARE

## 2025-04-04 ENCOUNTER — APPOINTMENT (OUTPATIENT)
Dept: PRIMARY CARE | Facility: CLINIC | Age: 80
End: 2025-04-04
Payer: MEDICARE

## 2025-04-04 VITALS — OXYGEN SATURATION: 96 % | SYSTOLIC BLOOD PRESSURE: 111 MMHG | HEART RATE: 77 BPM | DIASTOLIC BLOOD PRESSURE: 68 MMHG

## 2025-04-04 DIAGNOSIS — M10.9 ACUTE GOUT, UNSPECIFIED CAUSE, UNSPECIFIED SITE: Primary | ICD-10-CM

## 2025-04-04 PROCEDURE — 3078F DIAST BP <80 MM HG: CPT | Performed by: STUDENT IN AN ORGANIZED HEALTH CARE EDUCATION/TRAINING PROGRAM

## 2025-04-04 PROCEDURE — 1123F ACP DISCUSS/DSCN MKR DOCD: CPT | Performed by: STUDENT IN AN ORGANIZED HEALTH CARE EDUCATION/TRAINING PROGRAM

## 2025-04-04 PROCEDURE — 3074F SYST BP LT 130 MM HG: CPT | Performed by: STUDENT IN AN ORGANIZED HEALTH CARE EDUCATION/TRAINING PROGRAM

## 2025-04-04 PROCEDURE — 99213 OFFICE O/P EST LOW 20 MIN: CPT | Performed by: STUDENT IN AN ORGANIZED HEALTH CARE EDUCATION/TRAINING PROGRAM

## 2025-04-04 PROCEDURE — 1036F TOBACCO NON-USER: CPT | Performed by: STUDENT IN AN ORGANIZED HEALTH CARE EDUCATION/TRAINING PROGRAM

## 2025-04-04 RX ORDER — PREDNISONE 10 MG/1
TABLET ORAL
Qty: 31 TABLET | Refills: 0 | Status: SHIPPED | OUTPATIENT
Start: 2025-04-04 | End: 2025-04-16

## 2025-04-04 ASSESSMENT — ENCOUNTER SYMPTOMS
CONSTIPATION: 0
OCCASIONAL FEELINGS OF UNSTEADINESS: 1
WEAKNESS: 0
WHEEZING: 0
SPEECH DIFFICULTY: 0
DYSURIA: 0
COUGH: 0
FEVER: 0
ACTIVITY CHANGE: 0
VOMITING: 0
LOSS OF SENSATION IN FEET: 0
DEPRESSION: 0
HEMATURIA: 0
SHORTNESS OF BREATH: 0
ABDOMINAL PAIN: 0
ARTHRALGIAS: 1
NUMBNESS: 0
NAUSEA: 0
DIZZINESS: 0

## 2025-04-04 NOTE — PROGRESS NOTES
Subjective   Patient ID: Katarina Hernandez is a 79 y.o. female who presents for Right  foot pain.  HPI  Patient is here for follow-up of right first metacarpophalangeal joint pain.  She was diagnosed to have gout and was given prednisone 20 for 5 days at urgent care.  Was represcribed 40 mg of prednisone for 5 days by me.  Still reports to be ongoing pain.      Assessment/plan  Gout  Will get uric acid levels  Try more prolonged taper of prednisone.  Follow back if not feeling better in 1 week      Past Medical History:   Diagnosis Date    Combined forms of age-related cataract, left eye 07/22/2019    Combined form of age-related cataract, left eye    Combined forms of age-related cataract, right eye 08/30/2019    Combined form of age-related cataract, right eye    Lesion of ulnar nerve, unspecified upper limb     Ulnar neuropathy    Personal history of colonic polyps     History of colonic polyps    Transient cerebral ischemic attack, unspecified     TIA (transient ischemic attack)    Unspecified acute conjunctivitis, bilateral 01/27/2020    Acute conjunctivitis of both eyes    Unspecified cataract     Cataracts, both eyes    Viral conjunctivitis, unspecified 11/08/2019    Acute viral conjunctivitis of right eye      Past Surgical History:   Procedure Laterality Date    MR HEAD ANGIO WO IV CONTRAST  1/19/2019    MR HEAD ANGIO WO IV CONTRAST 1/19/2019 [facility]    MR NECK ANGIO WO IV CONTRAST  1/19/2019    MR NECK ANGIO WO IV CONTRAST 1/19/2019 [facility]    OTHER SURGICAL HISTORY  01/28/2019    Appendectomy    OTHER SURGICAL HISTORY  01/14/2020    Colonoscopy    OTHER SURGICAL HISTORY  01/14/2020    Esophagogastroduodenoscopy    OTHER SURGICAL HISTORY  09/09/2019    Cataract surgery      Family History   Problem Relation Name Age of Onset    Other (CVA) Mother      Coronary artery disease Mother      Hypertension Mother      Glaucoma Father      Hypertension Father      Multiple myeloma Sister       Diabetes Brother      Hypertension Brother      Diabetes Daughter      Macular degeneration Mother's Sister      Diabetes Father's Sister      Diabetes Father's Brother        Allergies   Allergen Reactions    Ciprofloxacin GI Upset and Nausea Only    Clindamycin Nausea And Vomiting    Penicillins Rash and Hives    Fluocinolone Unknown    Latex Hives    Quinolones Nausea And Vomiting and Nausea/vomiting    Streptomycin Unknown    Cimetidine Nausea And Vomiting    Erythromycin Nausea Only and Rash    Sulfa (Sulfonamide Antibiotics) Nausea And Vomiting and Unknown          Occupation:     Review of Systems   Constitutional:  Negative for activity change and fever.   HENT:  Negative for congestion.    Respiratory:  Negative for cough, shortness of breath and wheezing.    Cardiovascular:  Negative for chest pain and leg swelling.   Gastrointestinal:  Negative for abdominal pain, constipation, nausea and vomiting.   Endocrine: Negative for cold intolerance.   Genitourinary:  Negative for dysuria, hematuria and urgency.   Musculoskeletal:  Positive for arthralgias.   Neurological:  Negative for dizziness, speech difficulty, weakness and numbness.   Psychiatric/Behavioral:  Negative for self-injury and suicidal ideas.        Objective   Visit Vitals  /68 (BP Location: Left arm, Patient Position: Sitting, BP Cuff Size: Large adult)   Pulse 77   SpO2 96%   OB Status Postmenopausal   Smoking Status Never      Physical Exam  Constitutional:       Appearance: Normal appearance.   HENT:      Head: Normocephalic and atraumatic.      Nose: Nose normal.      Mouth/Throat:      Mouth: Mucous membranes are moist.   Eyes:      Conjunctiva/sclera: Conjunctivae normal.      Pupils: Pupils are equal, round, and reactive to light.   Cardiovascular:      Rate and Rhythm: Normal rate and regular rhythm.      Pulses: Normal pulses.      Heart sounds: Normal heart sounds.   Pulmonary:      Effort: Pulmonary effort is normal.       Breath sounds: Normal breath sounds.   Musculoskeletal:         General: Normal range of motion.      Cervical back: Neck supple.   Skin:     General: Skin is warm.   Neurological:      General: No focal deficit present.      Mental Status: She is alert and oriented to person, place, and time.   Psychiatric:         Mood and Affect: Mood normal.         Behavior: Behavior normal.         Thought Content: Thought content normal.         Judgment: Judgment normal.     First metacarpophalangeal joint-no erythema but tenderness    Assessment/Plan   Diagnoses and all orders for this visit:  Acute gout, unspecified cause, unspecified site  -     Uric acid; Future  -     predniSONE (Deltasone) 10 mg tablet; Take 5 tablets (50 mg) by mouth once daily for 2 days, THEN 4 tablets (40 mg) once daily for 2 days, THEN 3 tablets (30 mg) once daily for 2 days, THEN 2 tablets (20 mg) once daily for 2 days, THEN 1 tablet (10 mg) once daily for 2 days, THEN 0.5 tablets (5 mg) once daily for 2 days.

## 2025-04-05 LAB — URATE SERPL-MCNC: 8.5 MG/DL (ref 2.5–7)

## 2025-04-07 ENCOUNTER — EVALUATION (OUTPATIENT)
Dept: PHYSICAL THERAPY | Facility: CLINIC | Age: 80
End: 2025-04-07
Payer: MEDICARE

## 2025-04-07 DIAGNOSIS — M17.12 PRIMARY OSTEOARTHRITIS OF LEFT KNEE: ICD-10-CM

## 2025-04-07 DIAGNOSIS — M21.062 GENU VALGUM, ACQUIRED, LEFT: ICD-10-CM

## 2025-04-07 PROCEDURE — 97110 THERAPEUTIC EXERCISES: CPT | Mod: GP | Performed by: PHYSICAL THERAPIST

## 2025-04-07 PROCEDURE — 97162 PT EVAL MOD COMPLEX 30 MIN: CPT | Mod: GP | Performed by: PHYSICAL THERAPIST

## 2025-04-07 ASSESSMENT — ENCOUNTER SYMPTOMS
LOSS OF SENSATION IN FEET: 1
OCCASIONAL FEELINGS OF UNSTEADINESS: 0
DEPRESSION: 0

## 2025-04-07 NOTE — PROGRESS NOTES
Physical Therapy    Physical Therapy Evaluation and Treatment      Patient Name: Katarina Hernandez  MRN: [id number]  Today's Date: 4/7/2025  Visit: 1  Insurance: Reviewed  Physician: Dallas Bloom  Problem List Items Addressed This Visit             ICD-10-CM    Osteoarthritis of knee M17.9    Relevant Orders    Follow Up In Physical Therapy     Other Visit Diagnoses         Codes    Genu valgum, acquired, left     M21.062    Relevant Orders    Follow Up In Physical Therapy             Time Entry:   Time Calculation  Start Time: 0855  Stop Time: 0930  Time Calculation (min): 35 min  PT Evaluation Time Entry  PT Evaluation (Moderate) Time Entry: 25  PT Therapeutic Procedures Time Entry  Therapeutic Exercise Time Entry: 10      Assessment: Patient seen in PT for Initial Evaluation for left knee pain.   Patient presents with postural deviation  decreased knee ROM , decreased knee strength, knee tenderness, min left knee swelling.  Functionally, patient  unable to walk long distances, steps one at a time, and unable to pick objects off the ground.  Patient rates LEFS at 30%.    PT Assessment  Rehab Prognosis: Good  Evaluation/Treatment Tolerance: Patient tolerated treatment well     Plan:  Continue with POC  SciFIt stepper, knee ROM/strength,CKC, balance, PRE's, GT, CP    OP PT Plan  PT Plan: Skilled PT  PT Frequency: 1 time per week  Duration: 6  Onset Date: 02/21/25  Certification Period Start Date: 04/07/25  Certification Period End Date: 07/06/25  Rehab Potential: Good  Plan of Care Agreement: Patient    Current Problem:   1. Primary osteoarthritis of left knee  Referral to Physical Therapy    Follow Up In Physical Therapy      2. Genu valgum, acquired, left  Referral to Physical Therapy    Follow Up In Physical Therapy          Subjective    General: Patient with a history of left knee and ankle pain since last months.  Patient was put in a boot on left achilles for achilles tendonitis then she started having right  ankle/foot - diagnosed with gout.   Given 40mg prednisone with relief.   Onset was insidious.  Patient treated with voltaren gel..  Patient complains of pain in the region of the ant left knee, sore pain, 9/10 at worst last 7 days.  Patient's pain is worse with getting up and down from toilet, standing /standing, steps.  Functionally, patient is unable to pick objects off the floor, limited to 6 minutes walking due to knee pain.         Precautions: DM (up since taking prednisone), HTN, asthma,  Precautions  STEADI Fall Risk Score (The score of 4 or more indicates an increased risk of falling): 2     Prior Level of Function: able to walk further distances, and do steps reciprcal       Objective     Postural deviation: genu valgum  left knee ROM to 0 extension and 100 flexion  left Knee strength 3+/5 quads and 3+/5 hamstrings  Gait deviation: decreased toe off bilateral, right out toeing  Tender to palpation at the diffuse at the left knee  Balance: 3 sec SLS  Special Tests: negative patellar compression    Outcome Measures:  Other Measures  Lower Extremity Funtional Score (LEFS): 24     Treatments:  Patient instructed in HEP including:  SAQ, quad set, aa seated flexion, and step stretch 10-20 times each (10 minutes)      EDUCATION: HEP       Goals:  Active       PT Problem       PT Goal 1       Start:  04/07/25    Expected End:  07/06/25       Knee pain 3/10 or less         PT Goal 2       Start:  04/07/25    Expected End:  07/06/25       Improve LEFS by 20%         PT Goal 3       Start:  04/07/25    Expected End:  07/06/25       Knee ROM 0-110  Knee strength to 4-5/5         PT Goal 4       Start:  04/07/25    Expected End:  07/06/25       Normal toe off with gait          PT Goal 5       Start:  04/07/25    Expected End:  07/06/25       SLS to 5 sec + on right

## 2025-04-08 DIAGNOSIS — E11.9 TYPE 2 DIABETES MELLITUS WITHOUT COMPLICATION, WITH LONG-TERM CURRENT USE OF INSULIN: ICD-10-CM

## 2025-04-08 DIAGNOSIS — Z79.4 TYPE 2 DIABETES MELLITUS WITHOUT COMPLICATION, WITH LONG-TERM CURRENT USE OF INSULIN: ICD-10-CM

## 2025-04-08 RX ORDER — PEN NEEDLE, DIABETIC 29 G X1/2"
1 NEEDLE, DISPOSABLE MISCELLANEOUS 2 TIMES DAILY
Qty: 180 EACH | Refills: 3 | Status: SHIPPED | OUTPATIENT
Start: 2025-04-08 | End: 2026-04-03

## 2025-04-15 ENCOUNTER — APPOINTMENT (OUTPATIENT)
Dept: PRIMARY CARE | Facility: CLINIC | Age: 80
End: 2025-04-15
Payer: MEDICARE

## 2025-04-15 DIAGNOSIS — E11.9 TYPE 2 DIABETES MELLITUS WITHOUT COMPLICATION, WITH LONG-TERM CURRENT USE OF INSULIN: ICD-10-CM

## 2025-04-15 DIAGNOSIS — Z79.4 TYPE 2 DIABETES MELLITUS WITHOUT COMPLICATION, WITH LONG-TERM CURRENT USE OF INSULIN: ICD-10-CM

## 2025-04-15 RX ORDER — PEN NEEDLE, DIABETIC 29 G X1/2"
1 NEEDLE, DISPOSABLE MISCELLANEOUS 2 TIMES DAILY
Qty: 180 EACH | Refills: 3 | Status: SHIPPED | OUTPATIENT
Start: 2025-04-15 | End: 2026-04-10

## 2025-04-17 ENCOUNTER — APPOINTMENT (OUTPATIENT)
Dept: OPHTHALMOLOGY | Facility: CLINIC | Age: 80
End: 2025-04-17
Payer: MEDICARE

## 2025-04-17 DIAGNOSIS — H40.1131 PRIMARY OPEN ANGLE GLAUCOMA (POAG) OF BOTH EYES, MILD STAGE: Primary | ICD-10-CM

## 2025-04-17 PROCEDURE — 99214 OFFICE O/P EST MOD 30 MIN: CPT | Performed by: OPHTHALMOLOGY

## 2025-04-17 PROCEDURE — 92133 CPTRZD OPH DX IMG PST SGM ON: CPT | Performed by: OPHTHALMOLOGY

## 2025-04-17 PROCEDURE — G2211 COMPLEX E/M VISIT ADD ON: HCPCS | Performed by: OPHTHALMOLOGY

## 2025-04-17 RX ORDER — LATANOPROST 50 UG/ML
1 SOLUTION/ DROPS OPHTHALMIC NIGHTLY
Qty: 7.5 ML | Refills: 3 | Status: SHIPPED | OUTPATIENT
Start: 2025-04-17 | End: 2026-04-17

## 2025-04-17 ASSESSMENT — ENCOUNTER SYMPTOMS
NEUROLOGICAL NEGATIVE: 0
CONSTITUTIONAL NEGATIVE: 0
ALLERGIC/IMMUNOLOGIC NEGATIVE: 0
RESPIRATORY NEGATIVE: 0
HEMATOLOGIC/LYMPHATIC NEGATIVE: 0
ENDOCRINE NEGATIVE: 0
PSYCHIATRIC NEGATIVE: 0
CARDIOVASCULAR NEGATIVE: 0
EYES NEGATIVE: 1
GASTROINTESTINAL NEGATIVE: 0
MUSCULOSKELETAL NEGATIVE: 0

## 2025-04-17 ASSESSMENT — TONOMETRY
IOP_METHOD: GOLDMANN APPLANATION
OD_IOP_MMHG: 18
OS_IOP_MMHG: 19

## 2025-04-17 ASSESSMENT — CONF VISUAL FIELD
OS_INFERIOR_TEMPORAL_RESTRICTION: 0
OD_NORMAL: 1
OS_SUPERIOR_NASAL_RESTRICTION: 0
OD_INFERIOR_NASAL_RESTRICTION: 0
OD_SUPERIOR_TEMPORAL_RESTRICTION: 0
OS_NORMAL: 1
OD_INFERIOR_TEMPORAL_RESTRICTION: 0
OS_INFERIOR_NASAL_RESTRICTION: 0
OS_SUPERIOR_TEMPORAL_RESTRICTION: 0
OD_SUPERIOR_NASAL_RESTRICTION: 0

## 2025-04-17 ASSESSMENT — CUP TO DISC RATIO
OS_RATIO: 0.6
OD_RATIO: 0.6

## 2025-04-17 ASSESSMENT — VISUAL ACUITY
OD_CC: 20/20
OD_CC+: -1
OS_CC: 20/20
OS_CC+: -1
METHOD: SNELLEN - LINEAR
CORRECTION_TYPE: GLASSES

## 2025-04-17 ASSESSMENT — PACHYMETRY
OD_CT(UM): 503
OS_CT(UM): 506

## 2025-04-17 ASSESSMENT — EXTERNAL EXAM - LEFT EYE: OS_EXAM: NORMAL

## 2025-04-17 ASSESSMENT — SLIT LAMP EXAM - LIDS
COMMENTS: NORMAL
COMMENTS: NORMAL

## 2025-04-17 ASSESSMENT — EXTERNAL EXAM - RIGHT EYE: OD_EXAM: NORMAL

## 2025-04-17 NOTE — PROGRESS NOTES
primary open angle glaucoma, both eyes mild stage   +family hx in father, aunts, no hx of blindness   aa race   thinner pachs   tmax 30/33    inhaled/nasal steroids   gonio open   no trauma  Pedro Visual Field - OU - Both Eyes          Nsd OD  Nasal step OS   No evidence of prgressive disease OU             OCT, Optic Nerve - OU - Both Eyes          Thinning OS>od, STABLE to 24 but slight progression OS from 2020 baseline            OCT, Retina - OU - Both Eyes          Right Eye  Findings include normal observations.     Left Eye  Findings include normal observations.     Notes  Retinal multimodal imaging including photography was completed, and the findings are described in the examination.          long discussion with patient re-pathophysiology of glaucoma, potential blinding nature of disease   s/p SLT 1/22 OS   bblocker contraindicated   Good reduction with brim BID,  but developed allergy   IOP remains at goal <21   continue latan qhs OU  Rtc 6 months for HVF 24-2 and IOP check       diabetes without retinopathy: recommend yearly exams

## 2025-04-21 ENCOUNTER — TREATMENT (OUTPATIENT)
Dept: PHYSICAL THERAPY | Facility: CLINIC | Age: 80
End: 2025-04-21
Payer: MEDICARE

## 2025-04-21 DIAGNOSIS — M21.062 GENU VALGUM, ACQUIRED, LEFT: ICD-10-CM

## 2025-04-21 DIAGNOSIS — M17.12 PRIMARY OSTEOARTHRITIS OF LEFT KNEE: Primary | ICD-10-CM

## 2025-04-21 PROCEDURE — 97110 THERAPEUTIC EXERCISES: CPT | Mod: GP | Performed by: PHYSICAL THERAPIST

## 2025-04-21 NOTE — PROGRESS NOTES
Physical Therapy    Physical Therapy Treatment    Patient Name: Katarina Hernandez  MRN: [id number]  Today's Date: 4/21/2025  Visit: 2/7  Insurance: [place] [employer]   Authorization: 4/7/25-7/6/25    Time Entry:   Time Calculation  Start Time: 0730  Stop Time: 0810  Time Calculation (min): 40 min     PT Therapeutic Procedures Time Entry  Therapeutic Exercise Time Entry: 40      Assessment: Good understanding and compliance with HEP.      Plan:  Continue with HEP       Current Problem  1. Primary osteoarthritis of left knee  Follow Up In Physical Therapy      2. Genu valgum, acquired, left  Follow Up In Physical Therapy          General      Knee pain 1-2/10 at worst last 2 days   Patient states her gout is feeling better    Subjective    Precautions: DM, HTN, asthma     Pain       Objective     Independent with HEP     Treatments:  SciFIt stepper 7 minutes  Step stretch 20X each  Heel raises 20X  Practiced heel to toe gait 20' X 4  Ball squeezes 20X  Hip abd/er with red theraband 20X  Leg curls with red theraband 20X  Reviewed HEP   Theraball HS with strap 20X  SLR off bolster 10X  (40 minutes)      OP EDUCATION: HEP       Goals:  Active       PT Problem       PT Goal 1       Start:  04/07/25    Expected End:  07/06/25       Knee pain 3/10 or less         PT Goal 2       Start:  04/07/25    Expected End:  07/06/25       Improve LEFS by 20%         PT Goal 3       Start:  04/07/25    Expected End:  07/06/25       Knee ROM 0-110  Knee strength to 4-5/5         PT Goal 4       Start:  04/07/25    Expected End:  07/06/25       Normal toe off with gait          PT Goal 5       Start:  04/07/25    Expected End:  07/06/25       SLS to 5 sec + on right

## 2025-04-29 ENCOUNTER — TREATMENT (OUTPATIENT)
Dept: PHYSICAL THERAPY | Facility: CLINIC | Age: 80
End: 2025-04-29
Payer: MEDICARE

## 2025-04-29 DIAGNOSIS — M17.12 PRIMARY OSTEOARTHRITIS OF LEFT KNEE: ICD-10-CM

## 2025-04-29 DIAGNOSIS — M21.062 GENU VALGUM, ACQUIRED, LEFT: ICD-10-CM

## 2025-04-29 PROCEDURE — 97110 THERAPEUTIC EXERCISES: CPT | Mod: GP | Performed by: PHYSICAL THERAPIST

## 2025-04-29 NOTE — PROGRESS NOTES
Physical Therapy    Physical Therapy Treatment    Patient Name: Katarina Hernandez  MRN: [id number]  Today's Date: 4/29/2025  Visit: 3/7  Insurance: [place] [employer]   Authorization: 4/7/25-7/6/25  Time Entry:   Time Calculation  Start Time: 0805  Stop Time: 0855  Time Calculation (min): 50 min     PT Therapeutic Procedures Time Entry  Therapeutic Exercise Time Entry: 40      Assessment: Patient with increased symptoms after last visit.  Patient noted discomfort with knee extension stretch last visit.  CP after PT to help with residual inflammation after exercises.     Plan:  Continue with HEP       Current Problem  1. Primary osteoarthritis of left knee  Follow Up In Physical Therapy      2. Genu valgum, acquired, left  Follow Up In Physical Therapy          General      Knee pain 6-7/10 at worst last 2 days   Patient states her gout is feeling better  Riding stationary bike at home - up to 30 minutes    Subjective    Precautions: DM, HTN, asthma     Pain       Objective         Treatments:  SciFIt stepper 10 minutes  Step stretch 20X each  Heel raises 20X  Practiced heel to toe gait 20' X 4  Ball squeezes 20X  Hip abd/er with red theraband 20X  Leg curls with red theraband 20X  SAQ 2# 20X  SLR off bolster 2# 10X  Theraball HS with strap 20X  Theraball hip abd/add 20X   Hamstring stretch with strap 10X  (40 minutes)      OP EDUCATION: HEP       Goals:  Active       PT Problem       PT Goal 1       Start:  04/07/25    Expected End:  07/06/25       Knee pain 3/10 or less         PT Goal 2       Start:  04/07/25    Expected End:  07/06/25       Improve LEFS by 20%         PT Goal 3       Start:  04/07/25    Expected End:  07/06/25       Knee ROM 0-110  Knee strength to 4-5/5         PT Goal 4       Start:  04/07/25    Expected End:  07/06/25       Normal toe off with gait          PT Goal 5       Start:  04/07/25    Expected End:  07/06/25       SLS to 5 sec + on right

## 2025-05-05 ENCOUNTER — TREATMENT (OUTPATIENT)
Dept: PHYSICAL THERAPY | Facility: CLINIC | Age: 80
End: 2025-05-05
Payer: MEDICARE

## 2025-05-05 DIAGNOSIS — H40.1131 PRIMARY OPEN ANGLE GLAUCOMA (POAG) OF BOTH EYES, MILD STAGE: ICD-10-CM

## 2025-05-05 DIAGNOSIS — M17.12 PRIMARY OSTEOARTHRITIS OF LEFT KNEE: ICD-10-CM

## 2025-05-05 DIAGNOSIS — E11.9 TYPE 2 DIABETES MELLITUS WITHOUT COMPLICATION, WITHOUT LONG-TERM CURRENT USE OF INSULIN: ICD-10-CM

## 2025-05-05 DIAGNOSIS — I50.9 HEART FAILURE, UNSPECIFIED HF CHRONICITY, UNSPECIFIED HEART FAILURE TYPE: ICD-10-CM

## 2025-05-05 DIAGNOSIS — M21.062 GENU VALGUM, ACQUIRED, LEFT: ICD-10-CM

## 2025-05-05 DIAGNOSIS — J44.9 CHRONIC OBSTRUCTIVE PULMONARY DISEASE, UNSPECIFIED COPD TYPE (MULTI): ICD-10-CM

## 2025-05-05 PROCEDURE — 97110 THERAPEUTIC EXERCISES: CPT | Mod: GP | Performed by: PHYSICAL THERAPIST

## 2025-05-05 RX ORDER — LATANOPROST 50 UG/ML
1 SOLUTION/ DROPS OPHTHALMIC NIGHTLY
Qty: 7.5 ML | Refills: 3 | Status: SHIPPED | OUTPATIENT
Start: 2025-05-05 | End: 2026-05-05

## 2025-05-05 RX ORDER — TORSEMIDE 20 MG/1
20 TABLET ORAL DAILY
Qty: 90 TABLET | Refills: 1 | Status: SHIPPED | OUTPATIENT
Start: 2025-05-05

## 2025-05-05 RX ORDER — EMPAGLIFLOZIN 10 MG/1
10 TABLET, FILM COATED ORAL DAILY
Qty: 90 TABLET | Refills: 1 | Status: SHIPPED | OUTPATIENT
Start: 2025-05-05

## 2025-05-05 NOTE — PROGRESS NOTES
Physical Therapy    Physical Therapy Treatment    Patient Name: Katarina Hernandez  MRN: [id number]  Today's Date: 5/5/2025  Visit: 4/7  Insurance: [place] [employer]   Authorization: 4/7/25-7/6/25  Time Entry:   Time Calculation  Start Time: 0730  Stop Time: 0820  Time Calculation (min): 50 min     PT Therapeutic Procedures Time Entry  Therapeutic Exercise Time Entry: 40      Assessment:  Patient continues to have high pain levels especially if she is on her feet too long.  Encouraged patient to get off her feet if she notes her pain levels rising.  Advised to use CP at home for pain control.       Plan:  Continue with HEP       Current Problem  1. Primary osteoarthritis of left knee  Follow Up In Physical Therapy      2. Genu valgum, acquired, left  Follow Up In Physical Therapy          General      Knee pain 7-8/10 at worst last 2 days   Patient with increased pain after being on feet for period of time       Subjective    Precautions: DM, HTN, asthma     Pain       Objective         Treatments:  SciFIt stepper 10 minutes  Step stretch 20X each  Heel raises 20X  Ball squeezes 20X  Hip abd/er with green theraband 20X  Leg curls with red theraband 20X  SAQ 2.5# 20X  SLR off bolster 2.5# 10X  Theraball HS with strap 20X  Theraball hip abd/add 20X   Hamstring stretch with strap 10X  (40 minutes)    CP to the left knee 10 minutes      OP EDUCATION: HEP       Goals:  Active       PT Problem       PT Goal 1       Start:  04/07/25    Expected End:  07/06/25       Knee pain 3/10 or less         PT Goal 2       Start:  04/07/25    Expected End:  07/06/25       Improve LEFS by 20%         PT Goal 3       Start:  04/07/25    Expected End:  07/06/25       Knee ROM 0-110  Knee strength to 4-5/5         PT Goal 4       Start:  04/07/25    Expected End:  07/06/25       Normal toe off with gait          PT Goal 5       Start:  04/07/25    Expected End:  07/06/25       SLS to 5 sec + on right

## 2025-05-13 ENCOUNTER — TREATMENT (OUTPATIENT)
Dept: PHYSICAL THERAPY | Facility: CLINIC | Age: 80
End: 2025-05-13
Payer: MEDICARE

## 2025-05-13 DIAGNOSIS — M21.062 GENU VALGUM, ACQUIRED, LEFT: ICD-10-CM

## 2025-05-13 DIAGNOSIS — M17.12 PRIMARY OSTEOARTHRITIS OF LEFT KNEE: ICD-10-CM

## 2025-05-13 PROCEDURE — 97110 THERAPEUTIC EXERCISES: CPT | Mod: GP | Performed by: PHYSICAL THERAPIST

## 2025-05-13 NOTE — PROGRESS NOTES
Physical Therapy    Physical Therapy Treatment    Patient Name: Katarina Hernandez  MRN: [id number]  Today's Date: 5/13/2025  Visit: 5/7  Insurance: [place] [employer]   Authorization: 4/7/25-7/6/25  Time Entry:   Time Calculation  Start Time: 0800  Stop Time: 0850  Time Calculation (min): 50 min  PT Therapeutic Procedures Time Entry  Therapeutic Exercise Time Entry: 40      Assessment:  Patient seen in PT for 5 visits for knee oa with genu valgum.  Patient with some decrease in knee pain to 6/10,  Improved knee ROM, balance, gait training.  Knee pain goal and LEFS goal partially met.  Patient wishes to continue with exercises independently at home at this time.       Plan:  [facility]charge to [facility].        Current Problem  1. Primary osteoarthritis of left knee  Follow Up In Physical Therapy      2. Genu valgum, acquired, left  Follow Up In Physical Therapy          General      Knee pain 6/10 at worst last 2 days   Notes some instances when pain is better  Doing all normal ADL's - does have to take her time with some activities    Subjective    Precautions: DM, HTN, asthma     Pain       Objective     Knee ROM 0 - 125 right knee, 0 - 112 left knee  Normal gait   SLS 6 sec bilaterally   LEFS = 35%    Treatments:  SciFIt stepper 10 minutes  Step stretch 20X each  Heel raises 20X  Ball squeezes 20X  Hip abd/er with green theraband 20X  Leg curls with red theraband 20X  SAQ 2.5# 20X  SLR off bolster 2.5# 10X  Theraball HS with strap 20X  Theraball hip abd/add 20X   Hamstring stretch with strap 10X  (40 minutes)    CP to the left knee 10 minutes      OP EDUCATION: HEP       Goals:  Active       PT Problem       PT Goal 1       Start:  04/07/25    Expected End:  07/06/25       Knee pain 3/10 or less         PT Goal 2       Start:  04/07/25    Expected End:  07/06/25       Improve LEFS by 20%         PT Goal 3       Start:  04/07/25    Expected End:  07/06/25       Knee ROM 0-110  Knee strength to 4-5/5         PT Goal 4       Start:   04/07/25    Expected End:  07/06/25       Normal toe off with gait          PT Goal 5       Start:  04/07/25    Expected End:  07/06/25       SLS to 5 sec + on right

## 2025-05-15 RX ORDER — FLUTICASONE FUROATE, UMECLIDINIUM BROMIDE AND VILANTEROL TRIFENATATE 200; 62.5; 25 UG/1; UG/1; UG/1
1 POWDER RESPIRATORY (INHALATION)
Qty: 180 EACH | Refills: 3 | Status: SHIPPED | OUTPATIENT
Start: 2025-05-15

## 2025-05-24 DIAGNOSIS — J45.909 ASTHMA, UNSPECIFIED ASTHMA SEVERITY, UNSPECIFIED WHETHER COMPLICATED, UNSPECIFIED WHETHER PERSISTENT (HHS-HCC): ICD-10-CM

## 2025-05-25 RX ORDER — FLUTICASONE PROPIONATE 50 MCG
2 SPRAY, SUSPENSION (ML) NASAL DAILY
Qty: 48 ML | Refills: 1 | Status: SHIPPED | OUTPATIENT
Start: 2025-05-25

## 2025-06-18 ENCOUNTER — APPOINTMENT (OUTPATIENT)
Dept: PRIMARY CARE | Facility: CLINIC | Age: 80
End: 2025-06-18
Payer: MEDICARE

## 2025-06-18 VITALS
WEIGHT: 207 LBS | SYSTOLIC BLOOD PRESSURE: 108 MMHG | HEART RATE: 81 BPM | BODY MASS INDEX: 39.11 KG/M2 | DIASTOLIC BLOOD PRESSURE: 66 MMHG | OXYGEN SATURATION: 92 %

## 2025-06-18 DIAGNOSIS — K51.00 ULCERATIVE PANCOLITIS WITHOUT COMPLICATION (MULTI): ICD-10-CM

## 2025-06-18 DIAGNOSIS — E55.9 MILD VITAMIN D DEFICIENCY: ICD-10-CM

## 2025-06-18 DIAGNOSIS — I25.10 CORONARY ARTERY DISEASE DUE TO LIPID RICH PLAQUE: ICD-10-CM

## 2025-06-18 DIAGNOSIS — Z78.0 POSTMENOPAUSAL: ICD-10-CM

## 2025-06-18 DIAGNOSIS — J96.11 CHRONIC RESPIRATORY FAILURE WITH HYPOXIA: ICD-10-CM

## 2025-06-18 DIAGNOSIS — Z00.00 MEDICARE ANNUAL WELLNESS VISIT, SUBSEQUENT: Primary | ICD-10-CM

## 2025-06-18 DIAGNOSIS — I25.83 CORONARY ARTERY DISEASE DUE TO LIPID RICH PLAQUE: ICD-10-CM

## 2025-06-18 DIAGNOSIS — K51.90 ULCERATIVE COLITIS WITHOUT COMPLICATIONS, UNSPECIFIED LOCATION (MULTI): ICD-10-CM

## 2025-06-18 DIAGNOSIS — E66.01 MORBID OBESITY DUE TO EXCESS CALORIES (MULTI): ICD-10-CM

## 2025-06-18 DIAGNOSIS — E11.9 TYPE 2 DIABETES MELLITUS WITHOUT COMPLICATION, WITH LONG-TERM CURRENT USE OF INSULIN: ICD-10-CM

## 2025-06-18 DIAGNOSIS — K51.30 ULCERATIVE RECTOSIGMOIDITIS WITHOUT COMPLICATION (MULTI): ICD-10-CM

## 2025-06-18 DIAGNOSIS — Z12.31 ENCOUNTER FOR SCREENING MAMMOGRAM FOR MALIGNANT NEOPLASM OF BREAST: ICD-10-CM

## 2025-06-18 DIAGNOSIS — Z86.39 HISTORY OF INSULIN DEPENDENT DIABETES MELLITUS: ICD-10-CM

## 2025-06-18 DIAGNOSIS — Z79.4 TYPE 2 DIABETES MELLITUS WITHOUT COMPLICATION, WITH LONG-TERM CURRENT USE OF INSULIN: ICD-10-CM

## 2025-06-18 DIAGNOSIS — M10.9 GOUT, UNSPECIFIED CAUSE, UNSPECIFIED CHRONICITY, UNSPECIFIED SITE: ICD-10-CM

## 2025-06-18 DIAGNOSIS — I50.32 CHRONIC HEART FAILURE WITH PRESERVED EJECTION FRACTION: ICD-10-CM

## 2025-06-18 LAB
25(OH)D3+25(OH)D2 SERPL-MCNC: 62 NG/ML (ref 30–100)
ALBUMIN SERPL-MCNC: 4.1 G/DL (ref 3.6–5.1)
ALP SERPL-CCNC: 71 U/L (ref 37–153)
ALT SERPL-CCNC: 13 U/L (ref 6–29)
ANION GAP SERPL CALCULATED.4IONS-SCNC: 8 MMOL/L (CALC) (ref 7–17)
AST SERPL-CCNC: 15 U/L (ref 10–35)
BASOPHILS # BLD AUTO: 56 CELLS/UL (ref 0–200)
BASOPHILS NFR BLD AUTO: 0.5 %
BILIRUB SERPL-MCNC: 0.6 MG/DL (ref 0.2–1.2)
BUN SERPL-MCNC: 20 MG/DL (ref 7–25)
CALCIUM SERPL-MCNC: 9.6 MG/DL (ref 8.6–10.4)
CHLORIDE SERPL-SCNC: 100 MMOL/L (ref 98–110)
CO2 SERPL-SCNC: 34 MMOL/L (ref 20–32)
CREAT SERPL-MCNC: 0.83 MG/DL (ref 0.6–0.95)
EGFRCR SERPLBLD CKD-EPI 2021: 71 ML/MIN/1.73M2
EOSINOPHIL # BLD AUTO: 112 CELLS/UL (ref 15–500)
EOSINOPHIL NFR BLD AUTO: 1 %
ERYTHROCYTE [DISTWIDTH] IN BLOOD BY AUTOMATED COUNT: 13.6 % (ref 11–15)
EST. AVERAGE GLUCOSE BLD GHB EST-MCNC: 157 MG/DL
EST. AVERAGE GLUCOSE BLD GHB EST-SCNC: 8.7 MMOL/L
GLUCOSE SERPL-MCNC: 124 MG/DL (ref 65–99)
HBA1C MFR BLD: 7.1 %
HCT VFR BLD AUTO: 42.2 % (ref 35–45)
HGB BLD-MCNC: 12.8 G/DL (ref 11.7–15.5)
LYMPHOCYTES # BLD AUTO: 2699 CELLS/UL (ref 850–3900)
LYMPHOCYTES NFR BLD AUTO: 24.1 %
MAGNESIUM SERPL-MCNC: 2.3 MG/DL (ref 1.5–2.5)
MCH RBC QN AUTO: 26.4 PG (ref 27–33)
MCHC RBC AUTO-ENTMCNC: 30.3 G/DL (ref 32–36)
MCV RBC AUTO: 87.2 FL (ref 80–100)
MONOCYTES # BLD AUTO: 918 CELLS/UL (ref 200–950)
MONOCYTES NFR BLD AUTO: 8.2 %
NEUTROPHILS # BLD AUTO: 7414 CELLS/UL (ref 1500–7800)
NEUTROPHILS NFR BLD AUTO: 66.2 %
PHOSPHATE SERPL-MCNC: 3.2 MG/DL (ref 2.1–4.3)
PLATELET # BLD AUTO: 217 THOUSAND/UL (ref 140–400)
PMV BLD REES-ECKER: 11.9 FL (ref 7.5–12.5)
POTASSIUM SERPL-SCNC: 4.6 MMOL/L (ref 3.5–5.3)
PROT SERPL-MCNC: 7.1 G/DL (ref 6.1–8.1)
RBC # BLD AUTO: 4.84 MILLION/UL (ref 3.8–5.1)
SODIUM SERPL-SCNC: 142 MMOL/L (ref 135–146)
URATE SERPL-MCNC: 8.4 MG/DL (ref 2.5–7)
WBC # BLD AUTO: 11.2 THOUSAND/UL (ref 3.8–10.8)

## 2025-06-18 PROCEDURE — 3078F DIAST BP <80 MM HG: CPT | Performed by: STUDENT IN AN ORGANIZED HEALTH CARE EDUCATION/TRAINING PROGRAM

## 2025-06-18 PROCEDURE — 99215 OFFICE O/P EST HI 40 MIN: CPT | Performed by: STUDENT IN AN ORGANIZED HEALTH CARE EDUCATION/TRAINING PROGRAM

## 2025-06-18 PROCEDURE — 1170F FXNL STATUS ASSESSED: CPT | Performed by: STUDENT IN AN ORGANIZED HEALTH CARE EDUCATION/TRAINING PROGRAM

## 2025-06-18 PROCEDURE — 1125F AMNT PAIN NOTED PAIN PRSNT: CPT | Performed by: STUDENT IN AN ORGANIZED HEALTH CARE EDUCATION/TRAINING PROGRAM

## 2025-06-18 PROCEDURE — G0439 PPPS, SUBSEQ VISIT: HCPCS | Performed by: STUDENT IN AN ORGANIZED HEALTH CARE EDUCATION/TRAINING PROGRAM

## 2025-06-18 PROCEDURE — 1159F MED LIST DOCD IN RCRD: CPT | Performed by: STUDENT IN AN ORGANIZED HEALTH CARE EDUCATION/TRAINING PROGRAM

## 2025-06-18 PROCEDURE — 3074F SYST BP LT 130 MM HG: CPT | Performed by: STUDENT IN AN ORGANIZED HEALTH CARE EDUCATION/TRAINING PROGRAM

## 2025-06-18 RX ORDER — BLOOD-GLUCOSE,RECEIVER,CONT
EACH MISCELLANEOUS
Qty: 1 EACH | Refills: 0 | Status: SHIPPED | OUTPATIENT
Start: 2025-06-18

## 2025-06-18 RX ORDER — TIRZEPATIDE 10 MG/.5ML
10 INJECTION, SOLUTION SUBCUTANEOUS WEEKLY
Qty: 2 ML | Refills: 0 | Status: SHIPPED | OUTPATIENT
Start: 2025-06-18

## 2025-06-18 RX ORDER — BLOOD-GLUCOSE SENSOR
EACH MISCELLANEOUS
Qty: 3 EACH | Refills: 11 | Status: SHIPPED | OUTPATIENT
Start: 2025-06-18

## 2025-06-18 ASSESSMENT — PAIN SCALES - GENERAL: PAINLEVEL_OUTOF10: 6

## 2025-06-18 ASSESSMENT — ENCOUNTER SYMPTOMS
OCCASIONAL FEELINGS OF UNSTEADINESS: 1
DEPRESSION: 1
LOSS OF SENSATION IN FEET: 0

## 2025-06-18 ASSESSMENT — PATIENT HEALTH QUESTIONNAIRE - PHQ9
2. FEELING DOWN, DEPRESSED OR HOPELESS: SEVERAL DAYS
SUM OF ALL RESPONSES TO PHQ9 QUESTIONS 1 AND 2: 2
1. LITTLE INTEREST OR PLEASURE IN DOING THINGS: SEVERAL DAYS

## 2025-06-18 NOTE — PROGRESS NOTES
Katarina Hernandez is a 79 y.o. female seen in Clinic at [facility] [place] by Dr. Grayson Mauro on 06/18/25 for routine care, as well as for management of the following chronic medical conditions: COPD (on 2-3L O2, follows with pulm), AMY (on PAP), history of tobacco use (quit in 1990), CAD, TIA (on Plavix), Ulcerative Colitis, HTN, HFpEF, Aortic Regurgitation (mild), DLD, T2DM (IDDM), Gout. Patient is accompanied to this visit by her daughter. She presents today to establish care and for CPE/MCW visit.     #COPD   #AMY on CPAP   #History of Tobacco Use (quit in 1990)   - albuterol, Trelegy   - O2 dependence since around 2020  - 2-3L supplemental  - PAP compliant with bleed in O2 overnight   - last pulmonary function testing 2025   - follows with pulm, Dr. Jose    #CAD, DLD   #TIA history (2019)   - last seen by cardiology in 2022 (Dr. Pompa)  - CAC scoring 134 in 2022; prior stress testing 2021   - Plavix given TIA history   [  ] utility benefit of this vs. ASA to be considered long term   - Atorva 40 with LDL goal <70 (at goal per 02/2025 labs)   - glycemic control   - Concurrently on B-blocker (Coreg 12.5mg BID), ACE-I (Lisinopril), SGLT2i (Jardiance 10), Diuretic (Torsemide 20mg daily)  [  ] cardiology referral today      #Ulcerative Colitis  - diagnosed in early 2000s  - follows with gastroenterology   - Balsalazide 1500mg TID (lacking compliance)   - last flare in 2024   - last colonoscopy in 2022 (Elizabeth Rose)   - Calprotectin 218 in 08/2024  [  ] GI follow up   [  ] labs today      #HTN  #HFpEF  #Mild Aortic Regurgitation   - Carvedilol 12.5mg BID, Lisinopril 5mg, Torsemide 20mg daily, Jardiance 10mg   - home Bps typically 120-130s systolic   - last echo 08/2024 with preserved EF 66%, mild AR  [  ] refereral to Cardiology, Dr. Najera, as above   [  ] surveillance echo likely around 2027, sooner if acute issues arise     #T2DM   - current regimen: NPH 32 units BID; Jardiance 10mg, Tirzepatide 10mg   - follows  with podiatry, optho  [  ] CGM   [  ] titration of Jardiance and Tirzepatide considerations with effort to dose reduce insulin  [  ] consider transition to at bedtime/long acting insulin pending initiation of CGM and glycemic trends  [  ] pharmacy referral   [  ] close follow up in 2-3 months     #Gout  - recent recurrence/refractory at this point with recurrent flares requiring steroids  [  ] initiation of chronic urate lowering therapy upon resolution of current flare  [  ] uric acid level today     #L Achilles Tendonitis   #Knee OA     #Postmenopausal   - Calcium/Vitamin D supplementation   [  ] Vitamin D level: 62, sufficient, continue current supplement   [  ] overdue for DEXA, discuss at next visit     Past Medical History: as above   Medical History[1]  Subspecialty Medical Care:   - Pulmonary, Podiatry, Optho, Gastroenterology, Cardiology     Past Surgical History:   Past Surgical History:   Procedure Laterality Date    MR HEAD ANGIO WO IV CONTRAST  1/19/2019    MR HEAD ANGIO WO IV CONTRAST 1/19/2019 [facility]    MR NECK ANGIO WO IV CONTRAST  1/19/2019    MR NECK ANGIO WO IV CONTRAST 1/19/2019 [facility]    OTHER SURGICAL HISTORY  01/28/2019    Appendectomy    OTHER SURGICAL HISTORY  01/14/2020    Colonoscopy    OTHER SURGICAL HISTORY  01/14/2020    Esophagogastroduodenoscopy    OTHER SURGICAL HISTORY  09/09/2019    Cataract surgery     Medications:  Current Outpatient Medications:     albuterol 2.5 mg /3 mL (0.083 %) nebulizer solution, Take 3 mL (2.5 mg) by nebulization every 4 hours if needed for wheezing or shortness of breath., Disp: 540 mL, Rfl: 5    albuterol 90 mcg/actuation inhaler, USE 1 TO 2 INHALATIONS EVERY 4 TO 6 HOURS AS NEEDED, Disp: 18 g, Rfl: 11    atorvastatin (Lipitor) 40 mg tablet, TAKE 1 TABLET AT BEDTIME, Disp: 90 tablet, Rfl: 2    balsalazide (Colazal) 750 mg capsule, Take 2 capsules (1,500 mg) by mouth 3 times a day., Disp: 540 capsule, Rfl: 3    BD Insulin  Syringe Ultra-Fine 1 mL 31 gauge x 5/16 syringe, Inject 1 each under the skin 2 times a day., Disp: 180 each, Rfl: 3    calcium carbonate-vit D3-min 600 mg calcium- 400 unit tablet, Take 1 tablet by mouth once daily., Disp: , Rfl:     calcium carbonate-vitamin D3 600 mg-20 mcg (800 unit) tablet, Take 1 tablet by mouth once daily., Disp: , Rfl:     carvedilol (Coreg) 12.5 mg tablet, Take 1 tablet (12.5 mg) by mouth 2 times a day., Disp: 180 tablet, Rfl: 1    cholecalciferol (Vitamin D-3) 25 MCG (1000 UT) tablet, Take 1 tablet (25 mcg) by mouth once daily., Disp: , Rfl:     clopidogrel (Plavix) 75 mg tablet, TAKE 1 TABLET BY MOUTH ONCE DAILY., Disp: 90 tablet, Rfl: 1    clotrimazole-betamethasone (Lotrisone) cream, APPLY TO AFFECTED AREA TWICE A DAY, Disp: 45 g, Rfl: 3    diclofenac sodium 1 % kit, APPLY TO UPPER EXTREMITIES, 2 GM OF GEL TO AFFECTED AREA 4 TIMES DAILY.  DO NOT APPLY MORE THAN 8 GM DAILY TO ANY ONE AFFECTED JOINT., Disp: , Rfl:     fluticasone (Flonase) 50 mcg/actuation nasal spray, ADMINISTER 2 SPRAYS INTO EACH NOSTRIL ONCE DAILY., Disp: 48 mL, Rfl: 1    hydrocortisone (Anusol-HC) 2.5 % rectal cream, Insert into the rectum 2 times a day., Disp: 28 g, Rfl: 2    insulin NPH, Isophane, (HumuLIN N NPH U-100 Insulin) 100 unit/mL injection, Inject 32 Units under the skin 2 times a day before meals., Disp: 57.6 mL, Rfl: 2    Jardiance 10 mg tablet, TAKE 1 TABLET BY MOUTH EVERY DAY, Disp: 90 tablet, Rfl: 1    latanoprost (Xalatan) 0.005 % ophthalmic solution, ADMINISTER 1 DROP INTO BOTH EYES ONCE DAILY AT BEDTIME., Disp: 7.5 mL, Rfl: 3    lisinopril 5 mg tablet, TAKE 1 TABLET BY MOUTH EVERY DAY, Disp: 90 tablet, Rfl: 3    multivitamin (MULTIPLE VITAMINS ORAL), Take 1 tablet by mouth once daily., Disp: , Rfl:     ONETOUCH DELICA LANCETS MISC, 3 times a day., Disp: , Rfl:     OneTouch Ultra Test strip, 2 times a day., Disp: , Rfl:     pantoprazole (ProtoNix) 40 mg EC tablet, TAKE 1 TABLET BY MOUTH EVERY DAY IN  THE MORNING, Disp: 90 tablet, Rfl: 3    torsemide (Demadex) 20 mg tablet, TAKE 1 TABLET BY MOUTH EVERY DAY, Disp: 90 tablet, Rfl: 1    Trelegy Ellipta 200-62.5-25 mcg blister with device, INHALE 1 PUFF ONCE DAILY IN THE MORNING. TAKE BEFORE MEALS., Disp: 180 each, Rfl: 3    Dexcom G7  misc, Use as instructed, Disp: 1 each, Rfl: 0    Dexcom G7 Sensor device, Exchange every 10 days., Disp: 3 each, Rfl: 11    NovoLIN N NPH U-100 Insulin 100 unit/mL injection, INJECT 32 UNITS UNDER THE SKIN 2 TIMES A DAY BEFORE MEALS., Disp: 60 mL, Rfl: 1    tirzepatide (Mounjaro) 10 mg/0.5 mL pen injector, Inject 10 mg under the skin 1 (one) time per week., Disp: 2 mL, Rfl: 0    Current Facility-Administered Medications:     perflutren lipid microspheres (Definity) injection 4 mL of dilution, 4 mL of dilution, intravenous, Once, Ryan XAVIER MD  Pharmacy: [facility] ([place]     Allergies:   Allergies   Allergen Reactions    Ciprofloxacin GI Upset and Nausea Only    Clindamycin Nausea And Vomiting    Penicillins Rash and Hives    Fluocinolone Unknown    Latex Hives    Quinolones Nausea And Vomiting and Nausea/vomiting    Streptomycin Unknown    Cimetidine Nausea And Vomiting    Erythromycin Nausea Only and Rash    Sulfa (Sulfonamide Antibiotics) Nausea And Vomiting and Unknown     Immunizations:   - Flu annually advised  - Recommend staying UTD with COVID boosters  - RSV UTD 2025  - Tdap UTD 2023  - PCV-20 in 2023  - Shingrix complete     Family History:   Family History   Problem Relation Name Age of Onset    Other (CVA) Mother      Coronary artery disease Mother      Hypertension Mother      Glaucoma Father      Hypertension Father      Multiple myeloma Sister      Diabetes Brother      Hypertension Brother      Diabetes Daughter      Macular degeneration Mother's Sister      Diabetes Father's Sister      Diabetes Father's Brother         Social History:   Home/Living Situation/Falls/Safety Assessment: lives alone,  daughter for support   Education/Employment/Work/Vocational: retired   Activities: physical activity limited; spending time with family   Drug Use: remote smoker, quit in early 1990s  Diet: diabetic diet  Depression/Anxiety: positive; discussed, continue to monitor closely, daughter for support, may consider referral or medication    Sexuality/Contraception/Menstrual History: not active  Sleep: AMY on PAP    Patient Information:  Health Insurance: Medicare  Transportation: daughter  Healthcare POA/Guardian: daughter  Contact Information: correct in EMR    Visit Vitals  /66 (BP Location: Left arm, Patient Position: Sitting, BP Cuff Size: Large adult)   Pulse 81   Wt 93.9 kg (207 lb)   SpO2 92%   BMI 39.11 kg/m²   OB Status Postmenopausal   Smoking Status Never   BSA 2.01 m²      PHYSICAL EXAM:   General: well appearing AA female, NAD, pleasant and engaged in encounter    HEENT: NCAT, MMM, nasal cannula in place   CV: RRR, no m/r/g  PULM: CTAB, overall diminished breath sounds but no wheezing/rhonchi/rales   ABD: soft, obese, NT, ND, + bowel sounds   : no suprapubic or CVA tenderness   EXT: WWP, no significant edema   SKIN: no rashes noted   NEURO: A&Ox4, symmetric facies, no gross motor or sensory deficits, normal gait  PSYCH: pleasant mood, appropriate affect     Assessment/Plan    Katarina Hernandez is a 79 y.o. female seen in [place] at [facility] [place] Grayson Mauro on 06/18/25 for routine care, as well as for management of the following chronic medical conditions: COPD (on 2-3L O2, follows with pulm), AMY (on PAP), history of tobacco use (quit in 1990), CAD, TIA (on Plavix), Ulcerative Colitis, HTN, HFpEF, Aortic Regurgitation (mild), DLD, T2DM (IDDM), Gout. Patient is accompanied to this visit by her daughter. She presents today to establish care and for CPE/MCW visit.     #COPD   #AMY on CPAP   #History of Tobacco Use (quit in 1990)   - albuterol, Trelegy   - O2 dependence since around 2020  - 2-3L  supplemental  - PAP compliant with bleed in O2 overnight   - last pulmonary function testing 2025   - follows with pulm, Dr. Jose    #CAD, DLD   #TIA history (2019)   - last seen by cardiology in 2022 (Dr. Pompa)  - CAC scoring 134 in 2022; prior stress testing 2021   - Plavix given TIA history   [  ] utility benefit of this vs. ASA to be considered long term   - Atorva 40 with LDL goal <70 (at goal per 02/2025 labs)   - glycemic control   - Concurrently on B-blocker (Coreg 12.5mg BID), ACE-I (Lisinopril), SGLT2i (Jardiance 10), Diuretic (Torsemide 20mg daily)  [  ] cardiology referral today      #Ulcerative Colitis  - diagnosed in early 2000s  - follows with gastroenterology   - Balsalazide 1500mg TID (lacking compliance)   - last flare in 2024   - last colonoscopy in 2022 (Elizabeth Rose)   - Calprotectin 218 in 08/2024  [  ] GI follow up   [  ] labs today      #HTN  #HFpEF  #Mild Aortic Regurgitation   - Carvedilol 12.5mg BID, Lisinopril 5mg, Torsemide 20mg daily, Jardiance 10mg   - home Bps typically 120-130s systolic   - last echo 08/2024 with preserved EF 66%, mild AR  [  ] refereral to Cardiology, Dr. Najera, as above   [  ] surveillance echo likely around 2027, sooner if acute issues arise     #T2DM   - current regimen: NPH 32 units BID; Jardiance 10mg, Tirzepatide 10mg   - follows with podiatry, optho  [  ] CGM   [  ] titration of Jardiance and Tirzepatide considerations with effort to dose reduce insulin  [  ] consider transition to at bedtime/long acting insulin pending initiation of CGM and glycemic trends  [  ] pharmacy referral   [  ] close follow up in 2-3 months     #Gout  - recent recurrence/refractory at this point with recurrent flares requiring steroids  [  ] initiation of chronic urate lowering therapy upon resolution of current flare  [  ] uric acid level today     #L Achilles Tendonitis   #Knee OA     #Postmenopausal   - Calcium/Vitamin D supplementation   [  ] Vitamin D level: 62,  sufficient, continue current supplement   [  ] overdue for DEXA, discuss at next visit       #Health Maintenance  CPE/MCW visit: 6/18/2025    Cancer Screening  - Cervical Cancer Screening: last pap smear/HPV testing: NA   - Mammography: last 04/2024; updated ordered   - Colorectal Cancer Screening: follows with GI given UC history   - Lung Cancer Screening: not candidate; last imaging 2023 without any suspicious pulmonary nodules     Laboratory Screening  - Lipid Screen: DLD with LDL at goal per spring 2025 labs   - ASCVD Score: on statin   - A1C, glucose screen: labs today   - STI, HIV, Hep B screen: defers  - Hep C screen: defers    Imaging Screening  - Osteoporosis/DEXA screening: discuss at next visit; overdue if she would like to proceed     Immunizations:   - Flu annually advised  - Recommend staying UTD with COVID boosters  - RSV UTD 2025  - Tdap UTD 2023  - PCV-20 in 2023  - Shingrix complete     Other Screening  - Health Literacy Assessment: excellent   - Depression screen: positive; discussed, continue to monitor closely, daughter for support, may consider referral or medication    - Home safety/partner violence screen: lives alone   - Hearing/Vision screens: follows with optho  - Alcohol/tobacco/drug use screen: remote tobacco use; advise limited alcohol intake  - Healthcare POA/Advanced Directives: daughter     Referrals: Cardiology, pharmacy, CGM, mammogram, DEXA, labs, pending new start chronic urate lowering therapy     Return to clinic in 2-3 months for follow-up.    Patient Discussion:    Please call back the office with any questions at [phone]. In the case of an emergency, please call [phone] or go to the nearest Emergency Department.      Grayson Mauro MD  Internal Medicine-Pediatrics  [facility] [address], [address]  P: [phone], F: [phone]          [1]   Past Medical History:  Diagnosis Date    Combined forms of age-related cataract, left eye 07/22/2019    Combined form of  age-related cataract, left eye    Combined forms of age-related cataract, right eye 08/30/2019    Combined form of age-related cataract, right eye    Gout 06/2025    Lesion of ulnar nerve, unspecified upper limb     Ulnar neuropathy    Personal history of colonic polyps     History of colonic polyps    Transient cerebral ischemic attack, unspecified     TIA (transient ischemic attack)    Unspecified acute conjunctivitis, bilateral 01/27/2020    Acute conjunctivitis of both eyes    Unspecified cataract     Cataracts, both eyes    Viral conjunctivitis, unspecified 11/08/2019    Acute viral conjunctivitis of right eye

## 2025-06-19 ASSESSMENT — ACTIVITIES OF DAILY LIVING (ADL)
MANAGING_FINANCES: INDEPENDENT
TAKING_MEDICATION: INDEPENDENT
BATHING: INDEPENDENT
DOING_HOUSEWORK: NEEDS ASSISTANCE
GROCERY_SHOPPING: INDEPENDENT
DRESSING: INDEPENDENT

## 2025-06-23 ENCOUNTER — APPOINTMENT (OUTPATIENT)
Dept: PODIATRY | Facility: CLINIC | Age: 80
End: 2025-06-23
Payer: MEDICARE

## 2025-06-23 DIAGNOSIS — M79.671 FOOT PAIN, BILATERAL: Primary | ICD-10-CM

## 2025-06-23 DIAGNOSIS — M79.675 PAIN IN TOES OF BOTH FEET: ICD-10-CM

## 2025-06-23 DIAGNOSIS — M79.674 PAIN IN TOES OF BOTH FEET: ICD-10-CM

## 2025-06-23 DIAGNOSIS — G62.9 NEUROPATHY: ICD-10-CM

## 2025-06-23 DIAGNOSIS — M79.672 FOOT PAIN, BILATERAL: Primary | ICD-10-CM

## 2025-06-23 DIAGNOSIS — M19.071 ARTHRITIS OF BOTH MIDFEET: ICD-10-CM

## 2025-06-23 DIAGNOSIS — E11.9 DM TYPE 2 WITHOUT RETINOPATHY (MULTI): ICD-10-CM

## 2025-06-23 DIAGNOSIS — M19.072 ARTHRITIS OF BOTH MIDFEET: ICD-10-CM

## 2025-06-23 PROCEDURE — 99213 OFFICE O/P EST LOW 20 MIN: CPT | Performed by: PODIATRIST

## 2025-06-23 NOTE — PROGRESS NOTES
History of Present Illness:   Patient states they are here for nails as patient states she is unable to safely trim on her own  Patient also states the cream she has cleared up her prior rash, she uses as needed.   States she has NTB to feet, it does not wake her at night.   Most recent A1C 7.1 June 2025    Past Medical History  Past Medical History:   Diagnosis Date    Combined forms of age-related cataract, left eye 07/22/2019    Combined form of age-related cataract, left eye    Combined forms of age-related cataract, right eye 08/30/2019    Combined form of age-related cataract, right eye    Lesion of ulnar nerve, unspecified upper limb     Ulnar neuropathy    Personal history of colonic polyps     History of colonic polyps    Transient cerebral ischemic attack, unspecified     TIA (transient ischemic attack)    Unspecified acute conjunctivitis, bilateral 01/27/2020    Acute conjunctivitis of both eyes    Unspecified cataract     Cataracts, both eyes    Viral conjunctivitis, unspecified 11/08/2019    Acute viral conjunctivitis of right eye       Medications and Allergies have been reviewed.    Review Of Systems:  GENERAL: No weight loss, malaise or fevers.  HEENT: Negative for frequent or significant headaches,   RESPIRATORY: Negative for cough, wheezing or shortness of breath.  CARDIOVASCULAR: Negative for chest pain, leg swelling or palpitations.    Examination of Both Lower Extremities:   Objective:   Vasc: DP and PT pulses are palpable bilateral.  CFT is less than 3 seconds bilateral.  Skin temperature is warm to cool proximal to distal bilateral.      Neuro: Vibratory, light touch and proprioception are intact bilateral.     Derm: Nails 1-5 bilateral are elongated.  Skin is supple with normal texture and turgor noted.  Webspaces are clean, dry and intact bilateral.      Ortho: Muscle strength is 5/5 for all pedal groups tested.  Ankle joint, subtalar joint, 1st MPJ and lesser MPJ ROM is full and without  pain or crepitus.  Pain to midfoot ROM. No edema, erythema ecchymosis     1. Tinea pedis of both feet  clotrimazole-betamethasone (Lotrisone) cream      2. Foot pain, bilateral        3. Neuropathy        4. Onychomycosis        5. Arthritis, midfoot          Patient exam and eval  Dm foot health discussed  Use topical antifungal prn  Discussed stiff shoes  Shoes that do not twist or bend  Fu in 4 mos if no noted improvement  Gave rx for Dm shoes and inserts, list of vendors  Try and Keep sugars under 7 - doing great since last visit  Nails debrided  Fu 6 mos. Sooner if any new prob arise  All questions answered        Melida Vega DPM  [phone]  [address]  Fax: [phone]

## 2025-07-06 DIAGNOSIS — I50.9 HEART FAILURE, UNSPECIFIED HF CHRONICITY, UNSPECIFIED HEART FAILURE TYPE: ICD-10-CM

## 2025-07-06 DIAGNOSIS — E11.9 TYPE 2 DIABETES MELLITUS WITHOUT COMPLICATIONS: ICD-10-CM

## 2025-07-08 RX ORDER — CARVEDILOL 12.5 MG/1
12.5 TABLET ORAL 2 TIMES DAILY
Qty: 180 TABLET | Refills: 1 | Status: SHIPPED | OUTPATIENT
Start: 2025-07-08

## 2025-07-08 RX ORDER — LISINOPRIL 5 MG/1
5 TABLET ORAL DAILY
Qty: 90 TABLET | Refills: 3 | Status: SHIPPED | OUTPATIENT
Start: 2025-07-08

## 2025-07-11 DIAGNOSIS — E11.9 TYPE 2 DIABETES MELLITUS WITHOUT COMPLICATION, WITH LONG-TERM CURRENT USE OF INSULIN: ICD-10-CM

## 2025-07-11 DIAGNOSIS — Z79.4 TYPE 2 DIABETES MELLITUS WITHOUT COMPLICATION, WITH LONG-TERM CURRENT USE OF INSULIN: ICD-10-CM

## 2025-07-14 RX ORDER — TIRZEPATIDE 10 MG/.5ML
10 INJECTION, SOLUTION SUBCUTANEOUS WEEKLY
Qty: 6 ML | Refills: 1 | Status: SHIPPED | OUTPATIENT
Start: 2025-07-14 | End: 2025-07-16 | Stop reason: WASHOUT

## 2025-07-15 DIAGNOSIS — M10.9 GOUT, UNSPECIFIED CAUSE, UNSPECIFIED CHRONICITY, UNSPECIFIED SITE: Primary | ICD-10-CM

## 2025-07-15 RX ORDER — RESPIRATORY SYNCYTIAL VISUS VACCINE RECOMBINANT, ADJUVANTED 120MCG/0.5
KIT INTRAMUSCULAR
COMMUNITY
Start: 2025-01-23

## 2025-07-15 RX ORDER — ALLOPURINOL 300 MG/1
300 TABLET ORAL DAILY
Qty: 90 TABLET | Refills: 1 | Status: SHIPPED | OUTPATIENT
Start: 2025-07-15

## 2025-07-15 RX ORDER — ALLOPURINOL 100 MG/1
TABLET ORAL
Qty: 42 TABLET | Refills: 0 | Status: SHIPPED | OUTPATIENT
Start: 2025-07-15 | End: 2025-08-12

## 2025-07-16 DIAGNOSIS — E11.9 TYPE 2 DIABETES MELLITUS WITHOUT COMPLICATION, WITH LONG-TERM CURRENT USE OF INSULIN: Primary | ICD-10-CM

## 2025-07-16 DIAGNOSIS — K21.9 GASTROESOPHAGEAL REFLUX DISEASE, UNSPECIFIED WHETHER ESOPHAGITIS PRESENT: ICD-10-CM

## 2025-07-16 DIAGNOSIS — Z79.4 TYPE 2 DIABETES MELLITUS WITHOUT COMPLICATION, WITH LONG-TERM CURRENT USE OF INSULIN: Primary | ICD-10-CM

## 2025-07-16 RX ORDER — PANTOPRAZOLE SODIUM 40 MG/1
TABLET, DELAYED RELEASE ORAL
Qty: 90 TABLET | Refills: 1 | Status: SHIPPED | OUTPATIENT
Start: 2025-07-16

## 2025-07-16 RX ORDER — TIRZEPATIDE 7.5 MG/.5ML
7.5 INJECTION, SOLUTION SUBCUTANEOUS WEEKLY
Qty: 2 ML | Refills: 0 | Status: SHIPPED | OUTPATIENT
Start: 2025-07-16

## 2025-07-18 ENCOUNTER — APPOINTMENT (OUTPATIENT)
Dept: RADIOLOGY | Facility: HOSPITAL | Age: 80
End: 2025-07-18
Payer: MEDICARE

## 2025-07-22 ENCOUNTER — APPOINTMENT (OUTPATIENT)
Dept: PHARMACY | Facility: HOSPITAL | Age: 80
End: 2025-07-22
Payer: MEDICARE

## 2025-07-22 DIAGNOSIS — E11.9 DM TYPE 2 WITHOUT RETINOPATHY (MULTI): Primary | ICD-10-CM

## 2025-07-22 NOTE — PROGRESS NOTES
"  Clinical Pharmacy Appointment  Subjective     Patient ID: Katarina Hernandez is a 79 y.o. female who presents for Diabetes.    Referring Provider: Grayson Mauro MD   Last visit: 6/18/25  Next visit: 9/18/25    DIABETES MELLITUS TYPE 2:    Does patient follow with Endocrinology: No  Last optometry exam: 10/16/2025       Diet: recent family death - \"I want to eat more and drink more\"     Exercise: not discussed today     Allergies[1]    Objective     Current DM Pharmacotherapy:   Insulin NPH (Humulin)- 32 units twice daily before meals   Jardiance 10 mg - 1 tablet daily   Mounjaro 7.5 mg/0.5 mL - once weekly (Fridays)     Clarifications to above regimen: None  Adverse Effects: abdominal pain improved but still present- occurs whenever anything enters the stomach- having a \"sort of BM\" every 10-15 minutes    SECONDARY PREVENTION  - Statin? Yes, Atorvastatin  - ACEi/ARB? Yes, lisinopril  - Aspirin? No    Current monitoring regimen:   Patient is using: continuous glucose monitor - Dexcom G7    Testing frequency: at least twice daily     SMBG Readings: unable to give exact readings at this time     Any episodes of hypoglycemia? No.  Did patient treat episode of hypoglycemia appropriately? N/A    Pertinent PMH Review:  - PMH of Pancreatitis: No  - PMH/FH of Medullary Thyroid Cancer: No  - PMH/FH of Multiple Endocrine Neoplasia (MEN) Type II: No  - PMH of Retinopathy: No  - PMH of Urinary Tract Infections/Yeast Infections: No    Lab Review  BMP  Lab Results   Component Value Date    CALCIUM 9.6 06/18/2025     06/18/2025    K 4.6 06/18/2025    CO2 34 (H) 06/18/2025     06/18/2025    BUN 20 06/18/2025    CREATININE 0.83 06/18/2025    EGFR 71 06/18/2025     LFTs  Lab Results   Component Value Date    ALT 13 06/18/2025    AST 15 06/18/2025    ALKPHOS 71 06/18/2025    BILITOT 0.6 06/18/2025     FLP  Lab Results   Component Value Date    TRIG 81 02/14/2025    CHOL 129 02/14/2025    LDLF 59 02/17/2023    LDLCALC " 46 02/14/2025    HDL 67 02/14/2025       The ASCVD Risk score (Angel KING, et al., 2019) failed to calculate for the following reasons:    The 2019 ASCVD risk score is only valid for ages 40 to 79  Urine Microalbumin  Lab Results   Component Value Date    MICROALBCREA 8 02/14/2025     ALBUMIN/CREATININE RATIO, RANDOM URINE   Date Value Ref Range Status   02/14/2025 8 <30 mg/g creat Final     Comment:        The ADA defines abnormalities in albumin  excretion as follows:     Albuminuria Category        Result (mg/g creatinine)     Normal to Mildly increased   <30  Moderately increased            Severely increased           > OR = 300     The ADA recommends that at least two of three  specimens collected within a 3-6 month period be  abnormal before considering a patient to be  within a diagnostic category.       Albumin/Creatinine Ratio   Date Value Ref Range Status   07/26/2024 11.0 <30.0 ug/mg Creat Final   02/10/2024 13.3 <30.0 ug/mg Creat Final     Weight Management  Wt Readings from Last 3 Encounters:   06/18/25 93.9 kg (207 lb)   02/26/25 98.4 kg (217 lb)   02/12/25 98.4 kg (217 lb)      There is no height or weight on file to calculate BMI.   A1C  Lab Results   Component Value Date    HGBA1C 7.1 (H) 06/18/2025    HGBA1C 6.7 (H) 02/14/2025    HGBA1C 8.0 (H) 07/26/2024         Assessment/Plan     Problem List Items Addressed This Visit       DM type 2 without retinopathy (Multi) - Primary    Patient's goal A1c is < 8%.  Is pt at goal? Yes, 7.1%  Patient's SMBGs - unable to assess, patient reports frustration in difference between CGM and manual finger sticks, discussed expected variance vs malfunctioning sensor possibilities.     Rationale for plan: Patient grieving death of a family member at this time, feels this is contributing to poor BG numbers and does not wish to complete the remainder of this appointment. Will continue discussion next week.    Offered to explore BH resources with patient, declines  at this time.     Medication Changes:  CONTINUE  Insulin NPH (Humulin)- 32 units twice daily before meals   Jardiance 10 mg - 1 tablet daily   Mounjaro 7.5 mg/0.5 mL - once weekly (Fridays)     Future Considerations:  Titration of Mounjaro/Jardiance as tolerated to reduce insulin burden   Switch to long acting insulin     Monitoring and Education:  Discussed frustration with CGM- patient states this sensor has been closer to finger stick readings   Dexcom G7 can be calibrated with finger sticks to improve accuracy (provided test strips are current and accurate)               Follow up: I recommend diabetes care be 1 week    Latrice Schrader PharmD, BCACP  Clinical Pharmacy Specialist, Primary Care     Continue all meds under the continuation of care with the referring provider and clinical pharmacy team         [1]   Allergies  Allergen Reactions    Ciprofloxacin GI Upset and Nausea Only    Clindamycin Nausea And Vomiting    Penicillins Rash and Hives    Fluocinolone Unknown    Latex Hives    Quinolones Nausea And Vomiting and Nausea/vomiting    Streptomycin Unknown    Cimetidine Nausea And Vomiting    Erythromycin Nausea Only and Rash    Sulfa (Sulfonamide Antibiotics) Nausea And Vomiting and Unknown

## 2025-07-22 NOTE — ASSESSMENT & PLAN NOTE
Patient's goal A1c is < 8%.  Is pt at goal? Yes, 7.1%  Patient's SMBGs - unable to assess, patient reports frustration in difference between CGM and manual finger sticks, discussed expected variance vs malfunctioning sensor possibilities.     Rationale for plan: Patient grieving death of a family member at this time, feels this is contributing to poor BG numbers and does not wish to complete the remainder of this appointment. Will continue discussion next week.    Offered to explore  resources with patient, declines at this time.     Medication Changes:  CONTINUE  Insulin NPH (Humulin)- 32 units twice daily before meals   Jardiance 10 mg - 1 tablet daily   Mounjaro 7.5 mg/0.5 mL - once weekly (Fridays)     Future Considerations:  Titration of Mounjaro/Jardiance as tolerated to reduce insulin burden   Switch to long acting insulin     Monitoring and Education:  Discussed frustration with CGM- patient states this sensor has been closer to finger stick readings   Dexcom G7 can be calibrated with finger sticks to improve accuracy (provided test strips are current and accurate)

## 2025-07-25 ENCOUNTER — APPOINTMENT (OUTPATIENT)
Dept: CARDIOLOGY | Facility: CLINIC | Age: 80
End: 2025-07-25
Payer: MEDICARE

## 2025-07-25 VITALS
RESPIRATION RATE: 20 BRPM | HEIGHT: 61 IN | BODY MASS INDEX: 40.1 KG/M2 | OXYGEN SATURATION: 90 % | DIASTOLIC BLOOD PRESSURE: 65 MMHG | HEART RATE: 79 BPM | WEIGHT: 212.4 LBS | SYSTOLIC BLOOD PRESSURE: 126 MMHG

## 2025-07-25 DIAGNOSIS — Z86.73 HISTORY OF TRANSIENT ISCHEMIC ATTACK (TIA): ICD-10-CM

## 2025-07-25 DIAGNOSIS — E11.9 TYPE 2 DIABETES MELLITUS WITHOUT COMPLICATION, WITH LONG-TERM CURRENT USE OF INSULIN: ICD-10-CM

## 2025-07-25 DIAGNOSIS — M79.89 LEG SWELLING: ICD-10-CM

## 2025-07-25 DIAGNOSIS — I25.10 ATHEROSCLEROSIS OF NATIVE CORONARY ARTERY OF NATIVE HEART WITHOUT ANGINA PECTORIS: ICD-10-CM

## 2025-07-25 DIAGNOSIS — J44.9 CHRONIC OBSTRUCTIVE PULMONARY DISEASE, UNSPECIFIED COPD TYPE (MULTI): ICD-10-CM

## 2025-07-25 DIAGNOSIS — R06.09 DOE (DYSPNEA ON EXERTION): Primary | ICD-10-CM

## 2025-07-25 DIAGNOSIS — Z79.4 TYPE 2 DIABETES MELLITUS WITHOUT COMPLICATION, WITH LONG-TERM CURRENT USE OF INSULIN: ICD-10-CM

## 2025-07-25 PROBLEM — R94.31 PROLONGED QT INTERVAL: Status: RESOLVED | Noted: 2023-10-25 | Resolved: 2025-07-25

## 2025-07-25 PROBLEM — I50.9 ACUTE DECOMPENSATED HEART FAILURE: Status: RESOLVED | Noted: 2023-10-25 | Resolved: 2025-07-25

## 2025-07-25 PROCEDURE — 1036F TOBACCO NON-USER: CPT | Performed by: INTERNAL MEDICINE

## 2025-07-25 PROCEDURE — 3078F DIAST BP <80 MM HG: CPT | Performed by: INTERNAL MEDICINE

## 2025-07-25 PROCEDURE — 99204 OFFICE O/P NEW MOD 45 MIN: CPT | Performed by: INTERNAL MEDICINE

## 2025-07-25 PROCEDURE — 3074F SYST BP LT 130 MM HG: CPT | Performed by: INTERNAL MEDICINE

## 2025-07-25 PROCEDURE — 1159F MED LIST DOCD IN RCRD: CPT | Performed by: INTERNAL MEDICINE

## 2025-07-25 NOTE — Clinical Note
Vernon Galicia, thanks for referring Katarina to see me in clinic.  She has this lingering diagnosis of HFpEF in her record, and that was the main topic of discussion at her visit today.  Based on the data available to us right now I have to admit that she does not currently meet the universal definition of heart failure, and therefore I cannot diagnose her with HFpEF.  If we wanted to truly prove that she would need a right heart catheterization, which understandably she declined when I offered it to her because she is doing fine on her current regimen.  Please see the discussion part of my note for my thoughts. Best ezg

## 2025-07-25 NOTE — PATIENT INSTRUCTIONS
To reach Dr. Acevedo's office please call [phone] ([facility]). Fax [phone]. Call [phone] to schedule an appointment. You may also contact the HF RNs at [email or web address]    Thank you for coming to your appointment today. If you have any questions or need cardiac medication refills, please call the Heart Failure Office at [phone] option [phone].     Dr. Acevedo would be happy to see you in the future as needed.

## 2025-07-25 NOTE — PROGRESS NOTES
"    Heart Failure Cardiology    Katarina Hernandez is a 79 y.o. female from [place], [place], retired . She lives alone and is legally , and has a daughter in [facility]. She is referred to me for consultation by here PCP Dr. Grayson Mauro.    She was diagnosed with \"HFpEF\" in 2021 by Dr. Walker and Dr. Dana Pompa in setting of leg swelling and SOB. Over the years she has been maintained on antihypertensive meds and loop diuretic, as well as an SGLT2i.    Of note at the time this diagnosis was made her BNP was normal (25), and has not remained in the near/normal range over the years.    She has occasional palpitations, but otherwise is not aware of cardiac problems.     An echo done last year showed normal cardiac function without valve disease.    Studies:    Component  Ref Range & Units 11 mo ago 3 yr ago 6 yr ago   BNP  0 - 99 pg/mL 24 36 CM 25 CM   Resulting Agency [facility]        Echocardiogram 8/8/2024   1. Left ventricular ejection fraction is normal, calculated by Bolton's biplane at 66%.   2. There is normal right ventricular global systolic function.   3. Mild aortic valve regurgitation.    Exam: /65   Pulse 79   Resp 20   Ht (!) 1.549 m (5' 1\")   Wt 96.3 kg (212 lb 6.4 oz)   SpO2 90%   BMI 40.13 kg/m²   Pleasant alert and oriented  No JVD  Regular rate and rhythm, distant heart sounds  Reduced air sounds (uses O2 continuously)  No LE edema  ,  Current Outpatient Medications   Medication Instructions    albuterol 90 mcg/actuation inhaler USE 1 TO 2 INHALATIONS EVERY 4 TO 6 HOURS AS NEEDED    albuterol 2.5 mg, nebulization, Every 4 hours PRN    allopurinol (Zyloprim) 100 mg tablet Take 1 tablet (100 mg) by mouth once daily for 14 days, THEN 2 tablets (200 mg) once daily for 14 days.    allopurinol (ZYLOPRIM) 300 mg, oral, Daily    atorvastatin (LIPITOR) 40 mg, oral, Nightly    balsalazide (COLAZAL) 1,500 mg, oral, 3 times daily    BD " "Insulin Syringe Ultra-Fine 1 mL 31 gauge x 5/16 syringe 1 each, subcutaneous, 2 times daily    calcium carbonate-vit D3-min 600 mg calcium- 400 unit tablet 1 tablet, Daily    carvedilol (COREG) 12.5 mg, oral, 2 times daily    cholecalciferol (Vitamin D-3) 25 MCG (1000 UT) tablet 1 tablet, Daily    clopidogrel (PLAVIX) 75 mg, oral, Daily    Dexcom G7  misc Use as instructed    Dexcom G7 Sensor device Exchange every 10 days.    diclofenac sodium 1 % kit APPLY TO UPPER EXTREMITIES, 2 GM OF GEL TO AFFECTED AREA 4 TIMES DAILY.  DO NOT APPLY MORE THAN 8 GM DAILY TO ANY ONE AFFECTED JOINT.    fluticasone (Flonase) 50 mcg/actuation nasal spray 2 sprays, Each Nostril, Daily    HumuLIN N NPH U-100 Insulin 32 Units, subcutaneous, 2 times daily before meals    hydrocortisone (Anusol-HC) 2.5 % rectal cream rectal, 2 times daily    Jardiance 10 mg, oral, Daily    latanoprost (Xalatan) 0.005 % ophthalmic solution 1 drop, Both Eyes, Nightly    lisinopril 5 mg, oral, Daily    Mounjaro 7.5 mg, subcutaneous, Weekly    multivitamin (MULTIPLE VITAMINS ORAL) 1 tablet, Daily    ONETOUCH DELICA LANCETS MISC 3 times daily    OneTouch Ultra Test strip 2 times daily    pantoprazole (ProtoNix) 40 mg EC tablet TAKE 1 TABLET BY MOUTH EVERY DAY IN THE MORNING    torsemide (DEMADEX) 20 mg, oral, Daily    Trelegy Ellipta 200-62.5-25 mcg blister with device 1 puff, inhalation, Daily before breakfast     Past medical history (non-cardiac):  -- Obesity (BMI 40)  -- Hypertension   -- Diabetes type 2, insulin  -- COPD on home O2  -- Distant history of tobacco use (quit 1990s)  -- History of TIA (~early 2000s), on Plavix  -- Ulcerative colitis  -- Gout  -- Vertigo    Cardiovascular history:  -- Coronary atherosclerosis (CAC score 133 in 2022)    Assessment: 79 y.o. AAF with history of obesity, hypertension, IDDM, and COPD on home O2 who has been previously diagnosed with \"HFpEF\" due to symptoms and leg swelling, but the diagnosis was never " confirmed by either elevated BNP levels or findings of elevated filling pressures on RHC (a RHC has never been done).  It is very possible that her complex of signs and symptoms are related to her other comorbidities including COPD and diabetes.  Considering her normal BNP levels to the best of my ability I can only conclude that she does not have heart failure.    I offered her definitive testing with a right heart catheterization, but she understandably declined as she is currently doing okay. Further even if we were to find high filling pressures on catheterization we would likely advise SGLT2i + loop diuretic, which she is already on.    Plan:  -- It does not seem to me that she has HFpEF, but I am unable to prove or disprove it without further testing.  -- She describes rare bouts of palpitations. If this increases in frequency in the future please consider obtaining a holter x 2 weeks.  -- She is getting appropriate care for coronary atherosclerosis including statin + clopidogrel for prevention of MI.    I am available to her in the future as needed if her condition progresses or worsens.      Almas Acevedo MD, MPH  Advanced Heart Failure and Transplant Cardiology ([facility]FTC)  [facility]  [place], [place]    I reviewed and interpreted echocardiogram done in 2024.  I reviewed multiple records.  I communicated with the referring provider.

## 2025-07-29 ENCOUNTER — APPOINTMENT (OUTPATIENT)
Dept: PHARMACY | Facility: HOSPITAL | Age: 80
End: 2025-07-29
Payer: MEDICARE

## 2025-07-29 ENCOUNTER — OFFICE VISIT (OUTPATIENT)
Dept: PULMONOLOGY | Facility: CLINIC | Age: 80
End: 2025-07-29
Payer: MEDICARE

## 2025-07-29 VITALS
WEIGHT: 214.8 LBS | TEMPERATURE: 97 F | SYSTOLIC BLOOD PRESSURE: 116 MMHG | HEIGHT: 61 IN | BODY MASS INDEX: 40.55 KG/M2 | HEART RATE: 80 BPM | DIASTOLIC BLOOD PRESSURE: 75 MMHG | OXYGEN SATURATION: 95 %

## 2025-07-29 DIAGNOSIS — J45.909 ASTHMA, UNSPECIFIED ASTHMA SEVERITY, UNSPECIFIED WHETHER COMPLICATED, UNSPECIFIED WHETHER PERSISTENT (HHS-HCC): ICD-10-CM

## 2025-07-29 DIAGNOSIS — J44.89 ASTHMA-COPD OVERLAP SYNDROME (MULTI): ICD-10-CM

## 2025-07-29 DIAGNOSIS — E11.9 DM TYPE 2 WITHOUT RETINOPATHY (MULTI): Primary | ICD-10-CM

## 2025-07-29 DIAGNOSIS — J84.9 INTERSTITIAL LUNG DISEASE (MULTI): ICD-10-CM

## 2025-07-29 PROCEDURE — 99214 OFFICE O/P EST MOD 30 MIN: CPT | Performed by: INTERNAL MEDICINE

## 2025-07-29 PROCEDURE — 3078F DIAST BP <80 MM HG: CPT | Performed by: INTERNAL MEDICINE

## 2025-07-29 PROCEDURE — 1036F TOBACCO NON-USER: CPT | Performed by: INTERNAL MEDICINE

## 2025-07-29 PROCEDURE — 1159F MED LIST DOCD IN RCRD: CPT | Performed by: INTERNAL MEDICINE

## 2025-07-29 PROCEDURE — G2211 COMPLEX E/M VISIT ADD ON: HCPCS | Performed by: INTERNAL MEDICINE

## 2025-07-29 PROCEDURE — 3074F SYST BP LT 130 MM HG: CPT | Performed by: INTERNAL MEDICINE

## 2025-07-29 PROCEDURE — 1126F AMNT PAIN NOTED NONE PRSNT: CPT | Performed by: INTERNAL MEDICINE

## 2025-07-29 PROCEDURE — 99213 OFFICE O/P EST LOW 20 MIN: CPT | Performed by: INTERNAL MEDICINE

## 2025-07-29 RX ORDER — ALBUTEROL SULFATE 90 UG/1
INHALANT RESPIRATORY (INHALATION)
Qty: 18 G | Refills: 11 | Status: SHIPPED | OUTPATIENT
Start: 2025-07-29

## 2025-07-29 RX ORDER — PREDNISONE 20 MG/1
20 TABLET ORAL DAILY
Qty: 5 TABLET | Refills: 0 | Status: SHIPPED | OUTPATIENT
Start: 2025-07-29 | End: 2025-08-03

## 2025-07-29 RX ORDER — TIRZEPATIDE 10 MG/.5ML
1 INJECTION, SOLUTION SUBCUTANEOUS
Qty: 2 ML | Refills: 1 | Status: SHIPPED | OUTPATIENT
Start: 2025-07-29

## 2025-07-29 ASSESSMENT — COPD QUESTIONNAIRES
QUESTION4_WALKINCLINE: 5 - WHEN I WALK UP A HILL OR ONE FLIGHT OF STAIRS I AM VERY BREATHLESS
QUESTION3_CHESTTIGHTNESS: 3
QUESTION1_COUGHFREQUENCY: 4
QUESTION8_ENERGYLEVEL: 4
QUESTION6_LEAVINGHOUSE: 3
QUESTION2_CHESTPHLEGM: 4
QUESTION5_HOMEACTIVITIES: 3
CAT_TOTALSCORE: 29
QUESTION7_SLEEPQUALITY: 3

## 2025-07-29 ASSESSMENT — ASTHMA QUESTIONNAIRES
QUESTION_2 LAST FOUR WEEKS HOW OFTEN HAVE YOU HAD SHORTNESS OF BREATH: 1 OR 2 TIMES PER WEEK
QUESTION_5 LAST FOUR WEEKS HOW WOULD YOU RATE YOUR ASTHMA CONTROL: SOMEWHAT CONTROLLED
QUESTION_1 LAST FOUR WEEKS HOW MUCH OF THE TIME DID YOUR ASTHMA KEEP YOU FROM GETTING AS MUCH DONE AT WORK, SCHOOL OR AT HOME: SOME OF THE TIME
ACT_TOTALSCORE: 15
QUESTION_3 LAST FOUR WEEKS HOW OFTEN DID YOUR ASTHMA SYMPTOMS (WHEEZING, COUGHING, SHORTNESS OF BREATH, CHEST TIGHTNESS OR PAIN) WAKE YOU UP AT NIGHT OR EARLIER THAN USUAL IN THE MORNING: 2-3 NIGHTS A WEEK
QUESTION_4 LAST FOUR WEEKS HOW OFTEN HAVE YOU USED YOUR RESCUE INHALER OR NEBULIZER MEDICATION (SUCH AS ALBUTEROL): 2 OR 3 TIMES PER WEEK

## 2025-07-29 ASSESSMENT — PAIN SCALES - GENERAL: PAINLEVEL_OUTOF10: 0-NO PAIN

## 2025-07-29 NOTE — PROGRESS NOTES
Clinical Pharmacy Appointment  Subjective     Patient ID: Katarina Hernandez is a 79 y.o. female who presents for Diabetes.    Referring Provider: Grayson Mauro MD   Last visit: 6/18/25  Next visit: 9/18/25    DIABETES MELLITUS TYPE 2:    Does patient follow with Endocrinology: No  Last optometry exam: 10/16/2025       Diet: generally healthy but eats indiscriminately when events are happening     Exercise: not discussed today     Allergies[1]    Objective     Current DM Pharmacotherapy:   Insulin NPH (Humulin)- 32 units twice daily before meals   Jardiance 10 mg - 1 tablet daily   Mounjaro 7.5 mg/0.5 mL - once weekly (Fridays)     Clarifications to above regimen: 3 doses of Mounjaro   Adverse Effects: None- abdominal pain significantly improved - stool is still soft     SECONDARY PREVENTION  - Statin? Yes, Atorvastatin  - ACEi/ARB? Yes, lisinopril  - Aspirin? No    Current monitoring regimen:   Patient is using: continuous glucose monitor - Dexcom G7 - and glucometer     Testing frequency: at least twice daily     SMBG Readings:     -130 mg/dL     Any episodes of hypoglycemia? No.  Did patient treat episode of hypoglycemia appropriately? N/A    Pertinent PMH Review:  - PMH of Pancreatitis: No  - PMH/FH of Medullary Thyroid Cancer: No  - PMH/FH of Multiple Endocrine Neoplasia (MEN) Type II: No  - PMH of Retinopathy: No  - PMH of Urinary Tract Infections/Yeast Infections: No    Lab Review  BMP  Lab Results   Component Value Date    CALCIUM 9.6 06/18/2025     06/18/2025    K 4.6 06/18/2025    CO2 34 (H) 06/18/2025     06/18/2025    BUN 20 06/18/2025    CREATININE 0.83 06/18/2025    EGFR 71 06/18/2025     LFTs  Lab Results   Component Value Date    ALT 13 06/18/2025    AST 15 06/18/2025    ALKPHOS 71 06/18/2025    BILITOT 0.6 06/18/2025     FLP  Lab Results   Component Value Date    TRIG 81 02/14/2025    CHOL 129 02/14/2025    LDLF 59 02/17/2023    LDLCALC 46 02/14/2025    HDL 67 02/14/2025        The ASCVD Risk score (Angel KING, et al., 2019) failed to calculate for the following reasons:    The 2019 ASCVD risk score is only valid for ages 40 to 79  Urine Microalbumin  Lab Results   Component Value Date    MICROALBCREA 8 02/14/2025     ALBUMIN/CREATININE RATIO, RANDOM URINE   Date Value Ref Range Status   02/14/2025 8 <30 mg/g creat Final     Comment:        The ADA defines abnormalities in albumin  excretion as follows:     Albuminuria Category        Result (mg/g creatinine)     Normal to Mildly increased   <30  Moderately increased            Severely increased           > OR = 300     The ADA recommends that at least two of three  specimens collected within a 3-6 month period be  abnormal before considering a patient to be  within a diagnostic category.       Albumin/Creatinine Ratio   Date Value Ref Range Status   07/26/2024 11.0 <30.0 ug/mg Creat Final   02/10/2024 13.3 <30.0 ug/mg Creat Final     Weight Management  Wt Readings from Last 3 Encounters:   07/29/25 97.4 kg (214 lb 12.8 oz)   07/25/25 96.3 kg (212 lb 6.4 oz)   06/18/25 93.9 kg (207 lb)      There is no height or weight on file to calculate BMI.   A1C  Lab Results   Component Value Date    HGBA1C 7.1 (H) 06/18/2025    HGBA1C 6.7 (H) 02/14/2025    HGBA1C 8.0 (H) 07/26/2024         Assessment/Plan     Problem List Items Addressed This Visit       DM type 2 without retinopathy (Multi) - Primary    Patient's goal A1c is < 8%.  Is pt at goal? Yes, 7.1%  Patient's SMBGs are Generally well controlled.   patient reports frustration in difference between CGM and manual finger sticks, discussed expected variance and recommended manual calibration if she would like- granddaughter to help.   Rationale for plan: Plan to minimize insulin need and switch to a once daily long acting. Abdominal pain and loose stools have improved considerably with better adherence to UC medication.     Medication Changes:  CONTINUE  Jardiance 10 mg - 1 tablet  daily   INCREASE  Mounjaro 10 mg /0.5 mL -once weekly  DECREASE  Insulin NPH (Humulin)- 30 units twice daily     Monitoring and Education:  Continue Dexcom G7                Follow up: I recommend diabetes care be 4 weeks    Latrice HolcombD, BCACP  Clinical Pharmacy Specialist, Primary Care     Continue all meds under the continuation of care with the referring provider and clinical pharmacy team         [1]   Allergies  Allergen Reactions    Ciprofloxacin GI Upset and Nausea Only    Clindamycin Nausea And Vomiting    Penicillins Rash and Hives    Fluocinolone Unknown    Latex Hives    Quinolones Nausea And Vomiting and Nausea/vomiting    Streptomycin Unknown    Cimetidine Nausea And Vomiting    Erythromycin Nausea Only and Rash    Sulfa (Sulfonamide Antibiotics) Nausea And Vomiting and Unknown

## 2025-07-29 NOTE — ASSESSMENT & PLAN NOTE
Patient's goal A1c is < 8%.  Is pt at goal? Yes, 7.1%  Patient's SMBGs are Generally well controlled.   patient reports frustration in difference between CGM and manual finger sticks, discussed expected variance and recommended manual calibration if she would like- granddaughter to help.   Rationale for plan: Plan to minimize insulin need and switch to a once daily long acting. Abdominal pain and loose stools have improved considerably with better adherence to UC medication.     Medication Changes:  CONTINUE  Jardiance 10 mg - 1 tablet daily   INCREASE  Mounjaro 10 mg /0.5 mL -once weekly  DECREASE  Insulin NPH (Humulin)- 30 units twice daily     Monitoring and Education:  Continue Dexcom G7

## 2025-07-29 NOTE — PATIENT INSTRUCTIONS
Dear Katarina Hernandez It was nice seeing you today. We discussed the following:     You are doin well!  Continue trelegy   Made referral to pulmonary rehab   Refilled your albuterol   You do have wheezing on exam.  Sent a a prescription for pred.  If your shortness of breath or wheezing gets worse please call the office and let us know and start your prednisone  I will see you back in follow-up in 6 months with a chest CT     For scheduling purposes:    Call [phone] to schedule a breathing or a walking test   Should you have any questions Please Call my assistant Marylu Ford at [phone] or our pulmonary nurse Saloni Walter [phone].

## 2025-07-29 NOTE — PROGRESS NOTES
"Interval 7/29/2025  \"I Don't have any complaints\" She is overall doing well.  No emergency room visit or prednisone burst or hospitalization for COPD.  She continues to be active.  She wears her oxygen at 2 L nasal cannula all the time.  She is compliant with her CPAP. Gout in her right foot been bothering her and making her less active   CAT 29  ACT 15   MMRC 2    PE:  /75   Pulse 80   Temp 36.1 °C (97 °F)   Ht (!) 1.549 m (5' 1\")   Wt 97.4 kg (214 lb 12.8 oz)   SpO2 95%   BMI 40.59 kg/m²   Lungs: Decreased breath sounds throughout with some mild end expiratory wheezes      Interval 2/26/2025  She is overall doing well.  No emergency room visit or prednisone burst or hospitalization for COPD.  She continues to be active.  She wears her oxygen at 2 L nasal cannula all the time.  She is compliant with her CPAP.  She completed her pulmonary function test and 6-minute walk test and are for the most part stable and detailed below    CAT 30   MMRC 2 to 3     PE:  /83   Pulse 70   Resp 18   Ht 1.549 m (5' 1\")   Wt 98.4 kg (217 lb)   SpO2 95% Comment: 2L NC  BMI 41.00 kg/m²   Lungs: Diminished breath sounds bilaterally but no wheezing    Interval 8/21/2024  Katarina is doing very well.  She is using the Trelegy daily.  She only gets short of breath when it is hard side and she tries to stay inside in the air conditioner.  No exacerbation since I last saw her.  No weight loss loss of appetite fever chills night sweats.    Cat 17.  MMRC 2    PE:  /61   Pulse 72   Temp 36.3 °C (97.3 °F)   Resp 20   Wt 99.8 kg (220 lb)   SpO2 97% Comment: Using 2 liters of oxygen  BMI 41.57 kg/m²   Lungs: Diminished breath sounds bilaterally but no wheezing    Interim 12/6/2023     Nov 23 she had an episode upper respiratory infection with worsening shortness of breath and cough lending her an emergency room visit.  She was given doxycycline and benzonatate.  She feels a lot better and her cough is clearing " "up with continued whitish sputum.  She also describes runny nose.  She is using her Trelegy daily.  She feels about 60% better but she still have shortness of breath.  She uses 2 liters with her CPAP and 2 at rest and 3 when walking    PE: /52   Pulse 75   Temp 35.9 °C (96.6 °F)   Ht 1.549 m (5' 1\")   Wt 100 kg (221 lb)   SpO2 100%   BMI 41.76 kg/m²   Lungs: bilateral wheezing      Interim 06 2023   she is more tired than usual. She is compliant with her CPAP and following up with the sleep doctor. She remains active but not as much because of the fatigue and independent of her activities of daily living.   She is mMRC 3. She uses her oxygen all the time. No fever or chills or night sweats or weight loss or loss of appetite. No sick visits for COPD exacerbation or emergency room visits. No prednisone burst since I last saw her. She does have nocturnal cough but that is not bothering her. Denies reflux and postnasal drip. vaccinated for the flu pneumonia utd   Because of insurance coverage since she switched to the Wixela instead of the Advair and she is currently contemplating getting Spiriva to supplement.     Lungs: diminished BS bilaterally but no wheezes or crackles        interim 12 2 2022   SOB is the same. She is mMRC 3. She continues to be active and independent of her activities of daily living. She uses her oxygen all the time. No fever or chills or night sweats or weight loss or loss of appetite. No sick visits for COPD exacerbation or emergency room visits. No prednisone burst since I last saw her. She does have nocturnal cough but that is not bothering her. Denies reflux and postnasal drip.   vaccinated for the flu   pneumonia utd      Interim 08 2022   I last saw her in April 2022. Since I last saw her she feels stable She is still independent in her activities of daily living. She uses her oxygen all the time except at night. a nocturnal pulse ox while on CPAP showed desaturation. She " lives with her  who just suffered a stroke and she helps him. No COPD exacerbations since I last saw her, no prednisone burst and no ER visits for any COPD issues. She denies any fever or chills or night sweats. she lost about 11lbs since last I saw her. She is compliant with her CPAP and her inhalers      CAT score 22   ACT 15     Interim 04 05 2022  I last saw her in December 2021. Since I last saw her she feels stable except for worsening wheezing and perhaps slight worsening of her cough in the last month or so. She is still independent in her activities of daily living. She uses her oxygen all the time except at night. She lives by herself. No COPD exacerbations since I last saw her, no prednisone burst and no ER visits for any COPD issues. She denies any fever or chills or night sweats or weight loss or loss of appetite. She is compliant with her CPAP.     Initial  74 yo F morbidly obese and past smoker quit in 1989, 20 pack year smoker, CAD, DM2, obesity, AMY, UC, Ashtma-COPD, Chronic respiratory failure 2L exertion since Jan 2020 and sleep and 3L with portable tank when not using CPAP for AMY, Lung nodules, Chronic sinusitis, CAD, HTN, DM2   Since last visit doing OK, able to do most of her ADL;s without assistance. been on oxygen for about 2 years.  No ER visits or hospitalizations in the last 6 months to one year. uses her inhalers regularly. the incruseuse ellipta helped a lot. Have not done her stationary bike in the last month or so. she has some cough not bothersome. no fever or chills or [place] or weight loss. not on chronic prednisone but goes on prednisone couple of times but none since last year      Inhalers:  Advair 500/50 mcg 1 puff twice a day and rinse after  Albuterol MDI  Incruse 1 time a day   Albuterol neb broken- will give Rx         Social Hx: past smoker as outlined above, used to work for the [employer], retired 2007  No birds, hot tub, molds, toxic  exposures- in one of the buildings there was a problem with asbestos- some people she worked with are having lung issues from asbestos- exposed [phone] while working at the [employer] [employer] [facility].     Fhx: CAD, no known lung disease, no ILDs     Surgical Hx: Appendectomy, C section     PMHx: UC, Ashtma-COPD, Chronic respiratory failure 2L exertion and sleep and 3L with portable tank when not using CPAP, Lung nodules, Chronic sinusitis, CAD, HTN, DM2         PE: 12 2022  VS noted   CTA mild crackles at the bases         Diag data   Per prior documentation- PFT back in a February 2019 showed moderate obstructive ventilatory defect with increase post bronchodilator response by 20%  PFTs I have personally visualized the most recent pulmonary function testing results.     Date  FEV1/FVC FVC L(%) FEV1 L(%) TLC L(%) RV/TLC DLCO c( %)  02/21/2025  63    1.44--1.59 (10%)  0.91--1          13   1202023 63  1.55  0.98  06 2023 62, 1.54, .96 ; 9.8 (54%)  08/2022 .64, 1.67 (84%) 1 (72%), 11.5 (63%)  11/2021 0.6 1.47(74%) 0.9(56%) 128% 53% 10 (55%)   2/2019 1.48 0.97 (post BD, 20% BD response) 12     Six mn walk test   02/2025 2LNC 224 m   06 2023; 3 LNC 99%-99% walked about 226m   11 2021 3LNC, 182 m ; 100-99%      Chest CT multiple dating back to 2019 show stable fibrotic changes in an upper and mild lung zones predominance in a pattern suggestive of HP and indeterminant for UI P  Chest CT July stable findings compared to last year   Chest CT from May 2023 stable compared to June 2021     SABA, RF CCP none  Biopsy : none   Hypersensitivity panel neg   RF slightly up      TTE July 2022    Left Ventricle: The left ventricular systolic function is normal, with an estimated ejection fraction of 65%. There are no regional wall motion abnormalities. The left ventricular cavity size is normal. Spectral Doppler shows a normal pattern of left ventricular diastolic filling.  Left Atrium: The left atrium is normal in  size.  Right Ventricle: The right ventricle is normal in size. There is normal right ventricular global systolic function.  Right Atrium: The right atrium is normal in size.  Aortic Valve: The aortic valve is trileaflet. There is mild aortic valve cusp calcification. There is trace to mild aortic valve regurgitation. The peak instantaneous gradient of the aortic valve is 9.2 mmHg.  Mitral Valve: The mitral valve is normal in structure. There is no evidence of mitral valve regurgitation.  Tricuspid Valve: The tricuspid valve is structurally normal. No evidence of tricuspid regurgitation.  Pulmonic Valve: The pulmonic valve is structurally normal. There is no indication of pulmonic valve regurgitation.  Pericardium: There is no pericardial effusion noted.  Aorta: The aortic root is normal. There is no dilatation of the ascending aorta.  Systemic Veins: The inferior vena cava appears to be of normal size. There is IVC inspiratory collapse greater than 50%.         Provider Impressions and Plan:     This is a 79-year-old female prior smoker but who quit more than 20 years ago with:     1.COPD/Asthma overlap; Moderate obstructive lung disease on pulmonary function test dating back to at least 2019 and that is not any worse over the last few years. She previously had a 20% bronchodilator response and 10% on most recent one from 02/2025. She is responding well to LABA, LAMA and ICS therapy. She does have 1-2 COPD exacerbation per year Not requiring hospitalization, last Nov 2023.  Group B   -trelegy 200  -Utd on vaccines   -Pulm rehab referral   -consider ensifentrine        2.Diffuse parenchymal lung disease on a chest CT dating back to at least 2019. Per chart review this was also present when she was followed up at [facility] 5 or 6 years ago. She never had a biopsy. The pattern is indeterminate for UIP and suggestive of hypersensitivity pneumonitis. No exposures whatsoever. HP panel neg.  stable clinically and by testing  from  02/2025 HRCT 05/2023 stable --> continue watching and repeat chest CT in 6 months     3. Morbid obesity BMI 41      4.AMY  - on CPAP and following with sleep -nocturnal Oximetry on CPAP showed desaturation and we will prescribe O2 to be blended      5. Hypoxemic resp failure   In the setting of #1 and #2  Needs updated 6 mn walk testing before next visit ( feb 2025--2LNC)    6.  Mild wheezing on exam.  She was given a prescription for prednisone to use if her symptoms get worse    RTC in 6 months w testing

## 2025-07-30 ENCOUNTER — APPOINTMENT (OUTPATIENT)
Dept: PULMONOLOGY | Facility: CLINIC | Age: 80
End: 2025-07-30
Payer: MEDICARE

## 2025-07-31 ENCOUNTER — TELEPHONE (OUTPATIENT)
Dept: CARDIAC REHAB | Facility: CLINIC | Age: 80
End: 2025-07-31
Payer: MEDICARE

## 2025-08-18 ENCOUNTER — CLINICAL SUPPORT (OUTPATIENT)
Dept: CARDIAC REHAB | Facility: CLINIC | Age: 80
End: 2025-08-18
Payer: MEDICARE

## 2025-08-18 ENCOUNTER — TRANSCRIBE ORDERS (OUTPATIENT)
Dept: CARDIAC REHAB | Facility: CLINIC | Age: 80
End: 2025-08-18
Payer: MEDICARE

## 2025-08-18 VITALS
DIASTOLIC BLOOD PRESSURE: 60 MMHG | SYSTOLIC BLOOD PRESSURE: 108 MMHG | WEIGHT: 214 LBS | HEIGHT: 61 IN | BODY MASS INDEX: 40.4 KG/M2 | OXYGEN SATURATION: 95 %

## 2025-08-18 DIAGNOSIS — J84.9 ILD (INTERSTITIAL LUNG DISEASE) (MULTI): ICD-10-CM

## 2025-08-18 DIAGNOSIS — J84.9 INTERSTITIAL LUNG DISEASE (MULTI): Primary | ICD-10-CM

## 2025-08-18 ASSESSMENT — PATIENT HEALTH QUESTIONNAIRE - PHQ9
9. THOUGHTS THAT YOU WOULD BE BETTER OFF DEAD, OR OF HURTING YOURSELF: NOT AT ALL
4. FEELING TIRED OR HAVING LITTLE ENERGY: MORE THAN HALF THE DAYS
SUM OF ALL RESPONSES TO PHQ QUESTIONS 1-9: 7
SUM OF ALL RESPONSES TO PHQ9 QUESTIONS 1 & 2: 2
6. FEELING BAD ABOUT YOURSELF - OR THAT YOU ARE A FAILURE OR HAVE LET YOURSELF OR YOUR FAMILY DOWN: SEVERAL DAYS
5. POOR APPETITE OR OVEREATING: NOT AT ALL
SUM OF ALL RESPONSES TO PHQ QUESTIONS 1-9: 7
2. FEELING DOWN, DEPRESSED OR HOPELESS: NOT AT ALL
1. LITTLE INTEREST OR PLEASURE IN DOING THINGS: MORE THAN HALF THE DAYS
3. TROUBLE FALLING OR STAYING ASLEEP OR SLEEPING TOO MUCH: SEVERAL DAYS
7. TROUBLE CONCENTRATING ON THINGS, SUCH AS READING THE NEWSPAPER OR WATCHING TELEVISION: SEVERAL DAYS
8. MOVING OR SPEAKING SO SLOWLY THAT OTHER PEOPLE COULD HAVE NOTICED. OR THE OPPOSITE, BEING SO FIGETY OR RESTLESS THAT YOU HAVE BEEN MOVING AROUND A LOT MORE THAN USUAL: NOT AT ALL

## 2025-08-25 ENCOUNTER — APPOINTMENT (OUTPATIENT)
Dept: GASTROENTEROLOGY | Facility: CLINIC | Age: 80
End: 2025-08-25
Payer: MEDICARE

## 2025-08-26 ENCOUNTER — APPOINTMENT (OUTPATIENT)
Dept: PHARMACY | Facility: HOSPITAL | Age: 80
End: 2025-08-26
Payer: MEDICARE

## 2025-08-26 DIAGNOSIS — E11.9 DM TYPE 2 WITHOUT RETINOPATHY (MULTI): Primary | ICD-10-CM

## 2025-08-26 RX ORDER — TIRZEPATIDE 12.5 MG/.5ML
12.5 INJECTION, SOLUTION SUBCUTANEOUS
Qty: 2 ML | Refills: 2 | Status: SHIPPED | OUTPATIENT
Start: 2025-08-26

## 2025-09-02 ENCOUNTER — CLINICAL SUPPORT (OUTPATIENT)
Dept: CARDIAC REHAB | Facility: CLINIC | Age: 80
End: 2025-09-02
Payer: MEDICARE

## 2025-09-02 DIAGNOSIS — J84.9 ILD (INTERSTITIAL LUNG DISEASE) (MULTI): ICD-10-CM

## 2025-09-02 PROCEDURE — G0239 OTH RESP PROC, GROUP: HCPCS | Mod: KX | Performed by: INTERNAL MEDICINE

## 2025-09-18 ENCOUNTER — APPOINTMENT (OUTPATIENT)
Dept: PRIMARY CARE | Facility: CLINIC | Age: 80
End: 2025-09-18
Payer: MEDICARE

## 2025-09-23 ENCOUNTER — APPOINTMENT (OUTPATIENT)
Dept: PHARMACY | Facility: HOSPITAL | Age: 80
End: 2025-09-23
Payer: MEDICARE

## 2025-10-09 ENCOUNTER — APPOINTMENT (OUTPATIENT)
Dept: OPHTHALMOLOGY | Facility: CLINIC | Age: 80
End: 2025-10-09
Payer: MEDICARE

## 2025-10-16 ENCOUNTER — APPOINTMENT (OUTPATIENT)
Dept: OPHTHALMOLOGY | Facility: CLINIC | Age: 80
End: 2025-10-16
Payer: MEDICARE

## 2025-11-10 ENCOUNTER — APPOINTMENT (OUTPATIENT)
Dept: PODIATRY | Facility: CLINIC | Age: 80
End: 2025-11-10
Payer: MEDICARE

## 2026-06-18 ENCOUNTER — APPOINTMENT (OUTPATIENT)
Dept: PRIMARY CARE | Facility: CLINIC | Age: 81
End: 2026-06-18
Payer: MEDICARE